# Patient Record
Sex: MALE | Race: WHITE | NOT HISPANIC OR LATINO | URBAN - METROPOLITAN AREA
[De-identification: names, ages, dates, MRNs, and addresses within clinical notes are randomized per-mention and may not be internally consistent; named-entity substitution may affect disease eponyms.]

---

## 2019-07-02 ENCOUNTER — HOSPITAL ENCOUNTER (OUTPATIENT)
Dept: OTHER | Facility: HOSPITAL | Age: 84
Discharge: HOME | End: 2019-07-02
Attending: NURSE PRACTITIONER

## 2019-07-02 ENCOUNTER — OFFICE VISIT CONVERTED (OUTPATIENT)
Dept: FAMILY MEDICINE CLINIC | Age: 84
End: 2019-07-02
Attending: NURSE PRACTITIONER

## 2019-07-02 LAB
BASOPHILS # BLD MANUAL: 0.02 10*3/UL (ref 0–0.2)
BASOPHILS NFR BLD MANUAL: 0.3 % (ref 0–3)
DEPRECATED RDW RBC AUTO: 62.6 FL
EOSINOPHIL # BLD MANUAL: 0.18 10*3/UL (ref 0–0.7)
EOSINOPHIL NFR BLD MANUAL: 3 % (ref 0–7)
ERYTHROCYTE [DISTWIDTH] IN BLOOD BY AUTOMATED COUNT: 17.5 % (ref 11.5–14.5)
GRANS (ABSOLUTE): 3.59 10*3/UL (ref 2–8)
GRANS: 58.8 % (ref 30–85)
HBA1C MFR BLD: 9.7 G/DL (ref 14–18)
HCT VFR BLD AUTO: 30.6 % (ref 42–52)
IMM GRANULOCYTES # BLD: 0.03 10*3/UL (ref 0–0.54)
IMM GRANULOCYTES NFR BLD: 0.5 % (ref 0–0.43)
LYMPHOCYTES # BLD MANUAL: 1.36 10*3/UL (ref 1–5)
LYMPHOCYTES NFR BLD MANUAL: 15.1 % (ref 3–10)
MCH RBC QN AUTO: 30.6 PG (ref 27–31)
MCHC RBC AUTO-ENTMCNC: 31.7 G/DL (ref 33–37)
MCV RBC AUTO: 96.5 FL (ref 80–96)
MONOCYTES # BLD AUTO: 0.92 10*3/UL (ref 0.2–1.2)
PLATELET # BLD AUTO: 107 10*3/UL (ref 130–400)
PMV BLD AUTO: 10.3 FL (ref 7.4–10.4)
RBC # BLD AUTO: 3.17 10*6/UL (ref 4.7–6.1)
VARIANT LYMPHS NFR BLD MANUAL: 22.3 % (ref 20–45)
WBC # BLD AUTO: 6.1 10*3/UL (ref 4.8–10.8)

## 2019-07-16 ENCOUNTER — OFFICE VISIT CONVERTED (OUTPATIENT)
Dept: FAMILY MEDICINE CLINIC | Age: 84
End: 2019-07-16
Attending: FAMILY MEDICINE

## 2019-07-17 ENCOUNTER — HOSPITAL ENCOUNTER (OUTPATIENT)
Dept: OTHER | Facility: HOSPITAL | Age: 84
Discharge: HOME | End: 2019-07-17
Attending: FAMILY MEDICINE

## 2019-07-17 LAB
ALBUMIN SERPL-MCNC: 4.1 G/DL (ref 3.5–5)
ALBUMIN/GLOB SERPL: 1.2 {RATIO} (ref 1.4–2.6)
ALP SERPL-CCNC: 128 U/L (ref 56–155)
ALT SERPL-CCNC: 16 U/L (ref 10–40)
ANION GAP SERPL CALC-SCNC: 17 MMOL/L (ref 8–19)
AST SERPL-CCNC: 19 U/L (ref 15–50)
BASOPHILS # BLD AUTO: 0.02 10*3/UL (ref 0–0.2)
BASOPHILS NFR BLD AUTO: 0.3 % (ref 0–3)
BILIRUB SERPL-MCNC: 0.28 MG/DL (ref 0.2–1.3)
BNP SERPL-MCNC: 5437 PG/ML (ref 0–1800)
BUN SERPL-MCNC: 41 MG/DL (ref 5–25)
BUN/CREAT SERPL: 21 {RATIO} (ref 6–20)
CALCIUM SERPL-MCNC: 9.5 MG/DL (ref 8.7–10.4)
CHLORIDE SERPL-SCNC: 102 MMOL/L (ref 99–111)
CHOLEST SERPL-MCNC: 67 MG/DL (ref 107–200)
CHOLEST/HDLC SERPL: 3.2 {RATIO} (ref 3–6)
CONV ABS IMM GRAN: 0.03 10*3/UL (ref 0–0.2)
CONV CO2: 27 MMOL/L (ref 22–32)
CONV CREATININE URINE, RANDOM: 60.8 MG/DL (ref 10–300)
CONV IMMATURE GRAN: 0.5 % (ref 0–1.8)
CONV MICROALBUM.,U,RANDOM: 53.7 MG/L (ref 0–20)
CONV TOTAL PROTEIN: 7.6 G/DL (ref 6.3–8.2)
CREAT UR-MCNC: 1.93 MG/DL (ref 0.7–1.2)
DEPRECATED RDW RBC AUTO: 63.7 FL (ref 35.1–43.9)
EOSINOPHIL # BLD AUTO: 0.26 10*3/UL (ref 0–0.7)
EOSINOPHIL # BLD AUTO: 4.1 % (ref 0–7)
ERYTHROCYTE [DISTWIDTH] IN BLOOD BY AUTOMATED COUNT: 17.7 % (ref 11.6–14.4)
EST. AVERAGE GLUCOSE BLD GHB EST-MCNC: 120 MG/DL
FERRITIN SERPL-MCNC: 659 NG/ML (ref 30–300)
FOLATE SERPL-MCNC: >20 NG/ML (ref 4.8–20)
GFR SERPLBLD BASED ON 1.73 SQ M-ARVRAT: 31 ML/MIN/{1.73_M2}
GLOBULIN UR ELPH-MCNC: 3.5 G/DL (ref 2–3.5)
GLUCOSE SERPL-MCNC: 112 MG/DL (ref 70–99)
HBA1C MFR BLD: 5.8 % (ref 3.5–5.7)
HBA1C MFR BLD: 9.5 G/DL (ref 14–18)
HCT VFR BLD AUTO: 30.5 % (ref 42–52)
HDLC SERPL-MCNC: 21 MG/DL (ref 40–60)
IRON SATN MFR SERPL: 40 % (ref 20–55)
IRON SERPL-MCNC: 91 UG/DL (ref 70–180)
LDLC SERPL CALC-MCNC: 29 MG/DL (ref 70–100)
LYMPHOCYTES # BLD AUTO: 1.21 10*3/UL (ref 1–5)
MCH RBC QN AUTO: 30.8 PG (ref 27–31)
MCHC RBC AUTO-ENTMCNC: 31.1 G/DL (ref 33–37)
MCV RBC AUTO: 99 FL (ref 80–96)
MICROALBUMIN/CREAT UR: 88.3 MG/G{CRE} (ref 0–25)
MONOCYTES # BLD AUTO: 1.09 10*3/UL (ref 0.2–1.2)
MONOCYTES NFR BLD AUTO: 17.1 % (ref 3–10)
NEUTROPHILS # BLD AUTO: 3.78 10*3/UL (ref 2–8)
NEUTROPHILS NFR BLD AUTO: 59.1 % (ref 30–85)
NRBC CBCN: 0 % (ref 0–0.7)
OSMOLALITY SERPL CALC.SUM OF ELEC: 305 MOSM/KG (ref 273–304)
PLATELET # BLD AUTO: 111 10*3/UL (ref 130–400)
PMV BLD AUTO: 11.7 FL (ref 9.4–12.4)
POTASSIUM SERPL-SCNC: 4.3 MMOL/L (ref 3.5–5.3)
RBC # BLD AUTO: 3.08 10*6/UL (ref 4.7–6.1)
RETICS # AUTO: 0.07 10*6/UL (ref 0.03–0.1)
RETICS/RBC NFR AUTO: 2.42 % (ref 0.51–1.81)
SODIUM SERPL-SCNC: 142 MMOL/L (ref 135–147)
TIBC SERPL-MCNC: 226 UG/DL (ref 245–450)
TRANSFERRIN SERPL-MCNC: 158 MG/DL (ref 215–365)
TRIGL SERPL-MCNC: 83 MG/DL (ref 40–150)
TSH SERPL-ACNC: 1.61 M[IU]/L (ref 0.27–4.2)
VARIANT LYMPHS NFR BLD MANUAL: 18.9 % (ref 20–45)
VIT B12 SERPL-MCNC: >2000 PG/ML (ref 211–911)
VLDLC SERPL-MCNC: 17 MG/DL (ref 5–37)
WBC # BLD AUTO: 6.39 10*3/UL (ref 4.8–10.8)

## 2019-07-18 LAB
ASO AB SERPL-ACNC: 35 [IU]/ML (ref 0–200)
CONV ANTI NUCLEAR ANTIBODY WITH REFLEX: NEGATIVE
CONV RHEUMATOID FACTOR IGM: <10 [IU]/ML (ref 0–14)
CRP SERPL-MCNC: 5.6 MG/L (ref 0–5)
ERYTHROCYTE [SEDIMENTATION RATE] IN BLOOD: 94 MM/H (ref 0–20)
PHOSPHATE SERPL-MCNC: 3.4 MG/DL (ref 2.4–4.5)
URATE SERPL-MCNC: 8.6 MG/DL (ref 3.5–8.5)

## 2019-07-23 ENCOUNTER — OFFICE VISIT CONVERTED (OUTPATIENT)
Dept: FAMILY MEDICINE CLINIC | Age: 84
End: 2019-07-23
Attending: FAMILY MEDICINE

## 2019-07-23 ENCOUNTER — HOSPITAL ENCOUNTER (OUTPATIENT)
Dept: OTHER | Facility: HOSPITAL | Age: 84
Discharge: HOME | End: 2019-07-23
Attending: FAMILY MEDICINE

## 2019-07-23 LAB
ALBUMIN SERPL-MCNC: 4.2 G/DL (ref 3.5–5)
ALBUMIN/GLOB SERPL: 1.1 {RATIO} (ref 1.4–2.6)
ALP SERPL-CCNC: 111 U/L (ref 56–155)
ALT SERPL-CCNC: 14 U/L (ref 10–40)
ANION GAP SERPL CALC-SCNC: 19 MMOL/L (ref 8–19)
AST SERPL-CCNC: 22 U/L (ref 15–50)
BASOPHILS # BLD MANUAL: 0.02 10*3/UL (ref 0–0.2)
BASOPHILS NFR BLD MANUAL: 0.3 % (ref 0–3)
BILIRUB SERPL-MCNC: 0.36 MG/DL (ref 0.2–1.3)
BNP SERPL-MCNC: 5262 PG/ML (ref 0–1800)
BUN SERPL-MCNC: 47 MG/DL (ref 5–25)
BUN/CREAT SERPL: 20 {RATIO} (ref 6–20)
CALCIUM SERPL-MCNC: 9.9 MG/DL (ref 8.7–10.4)
CHLORIDE SERPL-SCNC: 101 MMOL/L (ref 99–111)
CONV CO2: 26 MMOL/L (ref 22–32)
CONV TOTAL PROTEIN: 8.1 G/DL (ref 6.3–8.2)
CREAT UR-MCNC: 2.3 MG/DL (ref 0.7–1.2)
DEPRECATED RDW RBC AUTO: 63.7 FL
EOSINOPHIL # BLD MANUAL: 0.25 10*3/UL (ref 0–0.7)
EOSINOPHIL NFR BLD MANUAL: 3.9 % (ref 0–7)
ERYTHROCYTE [DISTWIDTH] IN BLOOD BY AUTOMATED COUNT: 18.3 % (ref 11.5–14.5)
GFR SERPLBLD BASED ON 1.73 SQ M-ARVRAT: 25 ML/MIN/{1.73_M2}
GLOBULIN UR ELPH-MCNC: 3.9 G/DL (ref 2–3.5)
GLUCOSE SERPL-MCNC: 77 MG/DL (ref 70–99)
GRANS (ABSOLUTE): 3.54 10*3/UL (ref 2–8)
GRANS: 54.6 % (ref 30–85)
HBA1C MFR BLD: 10.2 G/DL (ref 14–18)
HCT VFR BLD AUTO: 31.9 % (ref 42–52)
IMM GRANULOCYTES # BLD: 0.03 10*3/UL (ref 0–0.54)
IMM GRANULOCYTES NFR BLD: 0.5 % (ref 0–0.43)
LYMPHOCYTES # BLD MANUAL: 1.64 10*3/UL (ref 1–5)
LYMPHOCYTES NFR BLD MANUAL: 15.4 % (ref 3–10)
MCH RBC QN AUTO: 30.7 PG (ref 27–31)
MCHC RBC AUTO-ENTMCNC: 32 G/DL (ref 33–37)
MCV RBC AUTO: 96.1 FL (ref 80–96)
MONOCYTES # BLD AUTO: 1 10*3/UL (ref 0.2–1.2)
OSMOLALITY SERPL CALC.SUM OF ELEC: 303 MOSM/KG (ref 273–304)
PLATELET # BLD AUTO: 106 10*3/UL (ref 130–400)
PMV BLD AUTO: 10.1 FL (ref 7.4–10.4)
POTASSIUM SERPL-SCNC: 4.5 MMOL/L (ref 3.5–5.3)
RBC # BLD AUTO: 3.32 10*6/UL (ref 4.7–6.1)
SODIUM SERPL-SCNC: 141 MMOL/L (ref 135–147)
VARIANT LYMPHS NFR BLD MANUAL: 25.3 % (ref 20–45)
WBC # BLD AUTO: 6.48 10*3/UL (ref 4.8–10.8)

## 2019-08-01 ENCOUNTER — OFFICE VISIT CONVERTED (OUTPATIENT)
Dept: SURGERY | Facility: CLINIC | Age: 84
End: 2019-08-01
Attending: NURSE PRACTITIONER

## 2019-08-01 ENCOUNTER — CONVERSION ENCOUNTER (OUTPATIENT)
Dept: SURGERY | Facility: CLINIC | Age: 84
End: 2019-08-01

## 2019-08-07 ENCOUNTER — OFFICE VISIT CONVERTED (OUTPATIENT)
Dept: FAMILY MEDICINE CLINIC | Age: 84
End: 2019-08-07
Attending: FAMILY MEDICINE

## 2019-08-15 ENCOUNTER — HOSPITAL ENCOUNTER (OUTPATIENT)
Dept: OTHER | Facility: HOSPITAL | Age: 84
Discharge: HOME | End: 2019-08-15
Attending: INTERNAL MEDICINE

## 2019-08-19 ENCOUNTER — OFFICE VISIT CONVERTED (OUTPATIENT)
Dept: UROLOGY | Facility: CLINIC | Age: 84
End: 2019-08-19
Attending: UROLOGY

## 2019-08-19 ENCOUNTER — CONVERSION ENCOUNTER (OUTPATIENT)
Dept: SURGERY | Facility: CLINIC | Age: 84
End: 2019-08-19

## 2019-08-21 ENCOUNTER — HOSPITAL ENCOUNTER (OUTPATIENT)
Dept: OTHER | Facility: HOSPITAL | Age: 84
Discharge: HOME | End: 2019-08-21
Attending: UROLOGY

## 2019-08-28 ENCOUNTER — CONVERSION ENCOUNTER (OUTPATIENT)
Dept: CARDIOLOGY | Facility: CLINIC | Age: 84
End: 2019-08-28

## 2019-08-28 ENCOUNTER — OFFICE VISIT CONVERTED (OUTPATIENT)
Dept: CARDIOLOGY | Facility: CLINIC | Age: 84
End: 2019-08-28
Attending: INTERNAL MEDICINE

## 2019-08-29 ENCOUNTER — OFFICE VISIT CONVERTED (OUTPATIENT)
Dept: UROLOGY | Facility: CLINIC | Age: 84
End: 2019-08-29
Attending: UROLOGY

## 2019-08-29 ENCOUNTER — CONVERSION ENCOUNTER (OUTPATIENT)
Dept: SURGERY | Facility: CLINIC | Age: 84
End: 2019-08-29

## 2019-09-18 ENCOUNTER — HOSPITAL ENCOUNTER (OUTPATIENT)
Dept: OTHER | Facility: HOSPITAL | Age: 84
Discharge: HOME | End: 2019-09-18
Attending: INTERNAL MEDICINE

## 2019-09-18 LAB
ALBUMIN SERPL-MCNC: 3.8 G/DL (ref 3.5–5)
ALBUMIN/GLOB SERPL: 1.2 {RATIO} (ref 1.4–2.6)
ALP SERPL-CCNC: 95 U/L (ref 56–155)
ALT SERPL-CCNC: 21 U/L (ref 10–40)
ANION GAP SERPL CALC-SCNC: 20 MMOL/L (ref 8–19)
AST SERPL-CCNC: 27 U/L (ref 15–50)
BASOPHILS # BLD AUTO: 0.08 10*3/UL (ref 0–0.2)
BASOPHILS NFR BLD AUTO: 0.9 % (ref 0–3)
BILIRUB SERPL-MCNC: 0.65 MG/DL (ref 0.2–1.3)
BUN SERPL-MCNC: 54 MG/DL (ref 5–25)
BUN/CREAT SERPL: 26 {RATIO} (ref 6–20)
CALCIUM SERPL-MCNC: 10.4 MG/DL (ref 8.7–10.4)
CHLORIDE SERPL-SCNC: 106 MMOL/L (ref 99–111)
CONV ABS IMM GRAN: 0.4 10*3/UL (ref 0–0.2)
CONV CO2: 23 MMOL/L (ref 22–32)
CONV IMMATURE GRAN: 4.6 % (ref 0–1.8)
CONV TOTAL PROTEIN: 7.1 G/DL (ref 6.3–8.2)
CREAT UR-MCNC: 2.11 MG/DL (ref 0.7–1.2)
DEPRECATED RDW RBC AUTO: 88 FL (ref 35.1–43.9)
EOSINOPHIL # BLD AUTO: 0.16 10*3/UL (ref 0–0.7)
EOSINOPHIL # BLD AUTO: 1.8 % (ref 0–7)
ERYTHROCYTE [DISTWIDTH] IN BLOOD BY AUTOMATED COUNT: 20.8 % (ref 11.6–14.4)
GFR SERPLBLD BASED ON 1.73 SQ M-ARVRAT: 28 ML/MIN/{1.73_M2}
GLOBULIN UR ELPH-MCNC: 3.3 G/DL (ref 2–3.5)
GLUCOSE SERPL-MCNC: 228 MG/DL (ref 70–99)
HCT VFR BLD AUTO: 32.8 % (ref 42–52)
HGB BLD-MCNC: 9 G/DL (ref 14–18)
LYMPHOCYTES # BLD AUTO: 1.43 10*3/UL (ref 1–5)
LYMPHOCYTES NFR BLD AUTO: 16.5 % (ref 20–45)
MCH RBC QN AUTO: 32.1 PG (ref 27–31)
MCHC RBC AUTO-ENTMCNC: 27.4 G/DL (ref 33–37)
MCV RBC AUTO: 117.1 FL (ref 80–96)
MONOCYTES # BLD AUTO: 1.15 10*3/UL (ref 0.2–1.2)
MONOCYTES NFR BLD AUTO: 13.2 % (ref 3–10)
NEUTROPHILS # BLD AUTO: 5.46 10*3/UL (ref 2–8)
NEUTROPHILS NFR BLD AUTO: 63 % (ref 30–85)
NRBC CBCN: 0.3 % (ref 0–0.7)
OSMOLALITY SERPL CALC.SUM OF ELEC: 320 MOSM/KG (ref 273–304)
PLATELET # BLD AUTO: 103 10*3/UL (ref 130–400)
PMV BLD AUTO: 10 FL (ref 9.4–12.4)
POTASSIUM SERPL-SCNC: 5.2 MMOL/L (ref 3.5–5.3)
RBC # BLD AUTO: 2.8 10*6/UL (ref 4.7–6.1)
SODIUM SERPL-SCNC: 144 MMOL/L (ref 135–147)
WBC # BLD AUTO: 8.68 10*3/UL (ref 4.8–10.8)

## 2019-09-19 LAB
IRON SATN MFR SERPL: 23 % (ref 20–55)
IRON SERPL-MCNC: 61 UG/DL (ref 70–180)
TIBC SERPL-MCNC: 263 UG/DL (ref 245–450)
TRANSFERRIN SERPL-MCNC: 184 MG/DL (ref 215–365)

## 2019-09-25 ENCOUNTER — OFFICE VISIT CONVERTED (OUTPATIENT)
Dept: FAMILY MEDICINE CLINIC | Age: 84
End: 2019-09-25
Attending: FAMILY MEDICINE

## 2019-09-26 ENCOUNTER — HOSPITAL ENCOUNTER (OUTPATIENT)
Dept: OTHER | Facility: HOSPITAL | Age: 84
Discharge: HOME | End: 2019-09-26
Attending: FAMILY MEDICINE

## 2019-09-26 LAB
ALBUMIN SERPL-MCNC: 3.9 G/DL (ref 3.5–5)
ALBUMIN/GLOB SERPL: 1.3 {RATIO} (ref 1.4–2.6)
ALP SERPL-CCNC: 93 U/L (ref 56–155)
ALT SERPL-CCNC: 28 U/L (ref 10–40)
ANION GAP SERPL CALC-SCNC: 22 MMOL/L (ref 8–19)
AST SERPL-CCNC: 30 U/L (ref 15–50)
BASOPHILS # BLD AUTO: 0.03 10*3/UL (ref 0–0.2)
BASOPHILS NFR BLD AUTO: 0.5 % (ref 0–3)
BILIRUB SERPL-MCNC: 0.37 MG/DL (ref 0.2–1.3)
BNP SERPL-MCNC: ABNORMAL PG/ML (ref 0–1800)
BUN SERPL-MCNC: 47 MG/DL (ref 5–25)
BUN/CREAT SERPL: 20 {RATIO} (ref 6–20)
CALCIUM SERPL-MCNC: 9.1 MG/DL (ref 8.7–10.4)
CHLORIDE SERPL-SCNC: 104 MMOL/L (ref 99–111)
CONV ABS IMM GRAN: 0.06 10*3/UL (ref 0–0.2)
CONV CO2: 21 MMOL/L (ref 22–32)
CONV IMMATURE GRAN: 0.9 % (ref 0–1.8)
CONV TOTAL PROTEIN: 7 G/DL (ref 6.3–8.2)
CREAT UR-MCNC: 2.36 MG/DL (ref 0.7–1.2)
DEPRECATED RDW RBC AUTO: 89.2 FL (ref 35.1–43.9)
EOSINOPHIL # BLD AUTO: 0.16 10*3/UL (ref 0–0.7)
EOSINOPHIL # BLD AUTO: 2.5 % (ref 0–7)
ERYTHROCYTE [DISTWIDTH] IN BLOOD BY AUTOMATED COUNT: 22.7 % (ref 11.6–14.4)
FERRITIN SERPL-MCNC: 700 NG/ML (ref 30–300)
FOLATE SERPL-MCNC: >20 NG/ML (ref 4.8–20)
GFR SERPLBLD BASED ON 1.73 SQ M-ARVRAT: 24 ML/MIN/{1.73_M2}
GLOBULIN UR ELPH-MCNC: 3.1 G/DL (ref 2–3.5)
GLUCOSE SERPL-MCNC: 169 MG/DL (ref 70–99)
HCT VFR BLD AUTO: 29.7 % (ref 42–52)
HGB BLD-MCNC: 8.8 G/DL (ref 14–18)
IRON SATN MFR SERPL: 21 % (ref 20–55)
IRON SERPL-MCNC: 55 UG/DL (ref 70–180)
LYMPHOCYTES # BLD AUTO: 1.22 10*3/UL (ref 1–5)
LYMPHOCYTES NFR BLD AUTO: 18.8 % (ref 20–45)
MCH RBC QN AUTO: 32.7 PG (ref 27–31)
MCHC RBC AUTO-ENTMCNC: 29.6 G/DL (ref 33–37)
MCV RBC AUTO: 110.4 FL (ref 80–96)
MONOCYTES # BLD AUTO: 1.17 10*3/UL (ref 0.2–1.2)
MONOCYTES NFR BLD AUTO: 18 % (ref 3–10)
NEUTROPHILS # BLD AUTO: 3.85 10*3/UL (ref 2–8)
NEUTROPHILS NFR BLD AUTO: 59.3 % (ref 30–85)
NRBC CBCN: 0 % (ref 0–0.7)
OSMOLALITY SERPL CALC.SUM OF ELEC: 310 MOSM/KG (ref 273–304)
PLATELET # BLD AUTO: 93 10*3/UL (ref 130–400)
PMV BLD AUTO: 10.7 FL (ref 9.4–12.4)
POTASSIUM SERPL-SCNC: 5.2 MMOL/L (ref 3.5–5.3)
RBC # BLD AUTO: 2.69 10*6/UL (ref 4.7–6.1)
RETICS # AUTO: 0.16 10*6/UL (ref 0.03–0.1)
RETICS/RBC NFR AUTO: 5.9 % (ref 0.51–1.81)
SODIUM SERPL-SCNC: 142 MMOL/L (ref 135–147)
TIBC SERPL-MCNC: 263 UG/DL (ref 245–450)
TRANSFERRIN SERPL-MCNC: 184 MG/DL (ref 215–365)
VIT B12 SERPL-MCNC: 1589 PG/ML (ref 211–911)
WBC # BLD AUTO: 6.49 10*3/UL (ref 4.8–10.8)

## 2019-09-30 ENCOUNTER — OFFICE VISIT CONVERTED (OUTPATIENT)
Dept: PULMONOLOGY | Facility: CLINIC | Age: 84
End: 2019-09-30
Attending: PHYSICIAN ASSISTANT

## 2019-10-01 ENCOUNTER — OFFICE VISIT CONVERTED (OUTPATIENT)
Dept: CARDIOLOGY | Facility: CLINIC | Age: 84
End: 2019-10-01
Attending: INTERNAL MEDICINE

## 2019-10-01 ENCOUNTER — CONVERSION ENCOUNTER (OUTPATIENT)
Dept: CARDIOLOGY | Facility: CLINIC | Age: 84
End: 2019-10-01

## 2019-10-04 ENCOUNTER — OFFICE VISIT CONVERTED (OUTPATIENT)
Dept: GASTROENTEROLOGY | Facility: CLINIC | Age: 84
End: 2019-10-04
Attending: PHYSICIAN ASSISTANT

## 2019-10-14 ENCOUNTER — HOSPITAL ENCOUNTER (OUTPATIENT)
Dept: CARDIOLOGY | Facility: HOSPITAL | Age: 84
Discharge: HOME | End: 2019-10-14
Attending: PHYSICIAN ASSISTANT

## 2019-10-15 ENCOUNTER — HOSPITAL ENCOUNTER (OUTPATIENT)
Dept: OTHER | Facility: HOSPITAL | Age: 84
Discharge: HOME | End: 2019-10-15
Attending: FAMILY MEDICINE

## 2019-10-15 LAB
ALBUMIN SERPL-MCNC: 3.8 G/DL (ref 3.5–5)
ALBUMIN/GLOB SERPL: 1.2 {RATIO} (ref 1.4–2.6)
ALP SERPL-CCNC: 97 U/L (ref 56–155)
ALT SERPL-CCNC: 16 U/L (ref 10–40)
ANION GAP SERPL CALC-SCNC: 18 MMOL/L (ref 8–19)
AST SERPL-CCNC: 21 U/L (ref 15–50)
BASOPHILS # BLD AUTO: 0.03 10*3/UL (ref 0–0.2)
BASOPHILS NFR BLD AUTO: 0.5 % (ref 0–3)
BILIRUB SERPL-MCNC: 0.29 MG/DL (ref 0.2–1.3)
BNP SERPL-MCNC: ABNORMAL PG/ML (ref 0–1800)
BUN SERPL-MCNC: 24 MG/DL (ref 5–25)
BUN/CREAT SERPL: 15 {RATIO} (ref 6–20)
CALCIUM SERPL-MCNC: 9.4 MG/DL (ref 8.7–10.4)
CHLORIDE SERPL-SCNC: 102 MMOL/L (ref 99–111)
CONV ABS IMM GRAN: 0.03 10*3/UL (ref 0–0.2)
CONV CO2: 23 MMOL/L (ref 22–32)
CONV IMMATURE GRAN: 0.5 % (ref 0–1.8)
CONV TOTAL PROTEIN: 7 G/DL (ref 6.3–8.2)
CREAT UR-MCNC: 1.6 MG/DL (ref 0.7–1.2)
DEPRECATED RDW RBC AUTO: 79.4 FL (ref 35.1–43.9)
EOSINOPHIL # BLD AUTO: 0.18 10*3/UL (ref 0–0.7)
EOSINOPHIL # BLD AUTO: 3.3 % (ref 0–7)
ERYTHROCYTE [DISTWIDTH] IN BLOOD BY AUTOMATED COUNT: 18.6 % (ref 11.6–14.4)
FERRITIN SERPL-MCNC: 731 NG/ML (ref 30–300)
FOLATE SERPL-MCNC: >20 NG/ML (ref 4.8–20)
GFR SERPLBLD BASED ON 1.73 SQ M-ARVRAT: 39 ML/MIN/{1.73_M2}
GLOBULIN UR ELPH-MCNC: 3.2 G/DL (ref 2–3.5)
GLUCOSE SERPL-MCNC: 111 MG/DL (ref 70–99)
HCT VFR BLD AUTO: 33 % (ref 42–52)
HGB BLD-MCNC: 9.5 G/DL (ref 14–18)
IRON SATN MFR SERPL: 21 % (ref 20–55)
IRON SERPL-MCNC: 51 UG/DL (ref 70–180)
LYMPHOCYTES # BLD AUTO: 1.09 10*3/UL (ref 1–5)
LYMPHOCYTES NFR BLD AUTO: 20 % (ref 20–45)
MCH RBC QN AUTO: 33.1 PG (ref 27–31)
MCHC RBC AUTO-ENTMCNC: 28.8 G/DL (ref 33–37)
MCV RBC AUTO: 115 FL (ref 80–96)
MONOCYTES # BLD AUTO: 0.98 10*3/UL (ref 0.2–1.2)
MONOCYTES NFR BLD AUTO: 17.9 % (ref 3–10)
NEUTROPHILS # BLD AUTO: 3.15 10*3/UL (ref 2–8)
NEUTROPHILS NFR BLD AUTO: 57.8 % (ref 30–85)
NRBC CBCN: 0 % (ref 0–0.7)
OSMOLALITY SERPL CALC.SUM OF ELEC: 293 MOSM/KG (ref 273–304)
PLATELET # BLD AUTO: 72 10*3/UL (ref 130–400)
PMV BLD AUTO: 10.6 FL (ref 9.4–12.4)
POTASSIUM SERPL-SCNC: 4.4 MMOL/L (ref 3.5–5.3)
RBC # BLD AUTO: 2.87 10*6/UL (ref 4.7–6.1)
RETICS # AUTO: 0.09 10*6/UL (ref 0.03–0.1)
RETICS/RBC NFR AUTO: 3.05 % (ref 0.51–1.81)
SODIUM SERPL-SCNC: 139 MMOL/L (ref 135–147)
TIBC SERPL-MCNC: 247 UG/DL (ref 245–450)
TRANSFERRIN SERPL-MCNC: 173 MG/DL (ref 215–365)
VIT B12 SERPL-MCNC: 1210 PG/ML (ref 211–911)
WBC # BLD AUTO: 5.46 10*3/UL (ref 4.8–10.8)

## 2019-10-16 ENCOUNTER — OFFICE VISIT CONVERTED (OUTPATIENT)
Dept: FAMILY MEDICINE CLINIC | Age: 84
End: 2019-10-16
Attending: FAMILY MEDICINE

## 2019-10-17 ENCOUNTER — HOSPITAL ENCOUNTER (OUTPATIENT)
Dept: OTHER | Facility: HOSPITAL | Age: 84
Discharge: HOME | End: 2019-10-17
Attending: FAMILY MEDICINE

## 2019-10-19 LAB
EST. AVERAGE GLUCOSE BLD GHB EST-MCNC: 103 MG/DL
HBA1C MFR BLD: 5.2 % (ref 3.5–5.7)

## 2019-10-24 ENCOUNTER — OFFICE VISIT CONVERTED (OUTPATIENT)
Dept: PULMONOLOGY | Facility: CLINIC | Age: 84
End: 2019-10-24
Attending: INTERNAL MEDICINE

## 2019-11-13 ENCOUNTER — OFFICE VISIT CONVERTED (OUTPATIENT)
Dept: FAMILY MEDICINE CLINIC | Age: 84
End: 2019-11-13
Attending: FAMILY MEDICINE

## 2019-11-13 ENCOUNTER — HOSPITAL ENCOUNTER (OUTPATIENT)
Dept: OTHER | Facility: HOSPITAL | Age: 84
Discharge: HOME | End: 2019-11-13
Attending: FAMILY MEDICINE

## 2019-11-13 LAB
ALBUMIN SERPL-MCNC: 4.1 G/DL (ref 3.5–5)
ALBUMIN/GLOB SERPL: 1.2 {RATIO} (ref 1.4–2.6)
ALP SERPL-CCNC: 104 U/L (ref 56–155)
ALT SERPL-CCNC: 15 U/L (ref 10–40)
ANION GAP SERPL CALC-SCNC: 18 MMOL/L (ref 8–19)
AST SERPL-CCNC: 20 U/L (ref 15–50)
BASOPHILS # BLD AUTO: 0.02 10*3/UL (ref 0–0.2)
BASOPHILS NFR BLD AUTO: 0.3 % (ref 0–3)
BILIRUB SERPL-MCNC: 0.39 MG/DL (ref 0.2–1.3)
BNP SERPL-MCNC: ABNORMAL PG/ML (ref 0–1800)
BUN SERPL-MCNC: 31 MG/DL (ref 5–25)
BUN/CREAT SERPL: 18 {RATIO} (ref 6–20)
CALCIUM SERPL-MCNC: 9.7 MG/DL (ref 8.7–10.4)
CHLORIDE SERPL-SCNC: 100 MMOL/L (ref 99–111)
CONV ABS IMM GRAN: 0.04 10*3/UL (ref 0–0.2)
CONV CO2: 27 MMOL/L (ref 22–32)
CONV IMMATURE GRAN: 0.6 % (ref 0–1.8)
CONV TOTAL PROTEIN: 7.4 G/DL (ref 6.3–8.2)
CREAT UR-MCNC: 1.73 MG/DL (ref 0.7–1.2)
DEPRECATED RDW RBC AUTO: 70.7 FL (ref 35.1–43.9)
EOSINOPHIL # BLD AUTO: 0.23 10*3/UL (ref 0–0.7)
EOSINOPHIL # BLD AUTO: 3.5 % (ref 0–7)
ERYTHROCYTE [DISTWIDTH] IN BLOOD BY AUTOMATED COUNT: 17.9 % (ref 11.6–14.4)
GFR SERPLBLD BASED ON 1.73 SQ M-ARVRAT: 36 ML/MIN/{1.73_M2}
GLOBULIN UR ELPH-MCNC: 3.3 G/DL (ref 2–3.5)
GLUCOSE SERPL-MCNC: 105 MG/DL (ref 70–99)
HCT VFR BLD AUTO: 30.8 % (ref 42–52)
HGB BLD-MCNC: 9.3 G/DL (ref 14–18)
LYMPHOCYTES # BLD AUTO: 1.19 10*3/UL (ref 1–5)
LYMPHOCYTES NFR BLD AUTO: 18.3 % (ref 20–45)
MCH RBC QN AUTO: 32.9 PG (ref 27–31)
MCHC RBC AUTO-ENTMCNC: 30.2 G/DL (ref 33–37)
MCV RBC AUTO: 108.8 FL (ref 80–96)
MONOCYTES # BLD AUTO: 1.18 10*3/UL (ref 0.2–1.2)
MONOCYTES NFR BLD AUTO: 18.1 % (ref 3–10)
NEUTROPHILS # BLD AUTO: 3.86 10*3/UL (ref 2–8)
NEUTROPHILS NFR BLD AUTO: 59.2 % (ref 30–85)
NRBC CBCN: 0 % (ref 0–0.7)
OSMOLALITY SERPL CALC.SUM OF ELEC: 299 MOSM/KG (ref 273–304)
PLATELET # BLD AUTO: 98 10*3/UL (ref 130–400)
PMV BLD AUTO: 11.2 FL (ref 9.4–12.4)
POTASSIUM SERPL-SCNC: 4.1 MMOL/L (ref 3.5–5.3)
RBC # BLD AUTO: 2.83 10*6/UL (ref 4.7–6.1)
SODIUM SERPL-SCNC: 141 MMOL/L (ref 135–147)
TSH SERPL-ACNC: 0.31 M[IU]/L (ref 0.27–4.2)
WBC # BLD AUTO: 6.52 10*3/UL (ref 4.8–10.8)

## 2019-11-14 LAB
IRON SATN MFR SERPL: 15 % (ref 20–55)
IRON SERPL-MCNC: 38 UG/DL (ref 70–180)
TIBC SERPL-MCNC: 252 UG/DL (ref 245–450)
TRANSFERRIN SERPL-MCNC: 176 MG/DL (ref 215–365)

## 2019-11-21 ENCOUNTER — OFFICE VISIT CONVERTED (OUTPATIENT)
Dept: PODIATRY | Facility: CLINIC | Age: 84
End: 2019-11-21
Attending: PODIATRIST

## 2019-12-10 ENCOUNTER — HOSPITAL ENCOUNTER (OUTPATIENT)
Dept: PREADMISSION TESTING | Facility: HOSPITAL | Age: 84
Discharge: HOME | End: 2019-12-10
Attending: UROLOGY

## 2019-12-10 LAB
ALBUMIN SERPL-MCNC: 3.4 G/DL (ref 3.5–5)
ALBUMIN/GLOB SERPL: 0.9 {RATIO} (ref 1.4–2.6)
ALP SERPL-CCNC: 109 U/L (ref 56–155)
ALT SERPL-CCNC: 17 U/L (ref 10–40)
ANION GAP SERPL CALC-SCNC: 16 MMOL/L (ref 8–19)
AST SERPL-CCNC: 20 U/L (ref 15–50)
BASOPHILS # BLD AUTO: 0.02 10*3/UL (ref 0–0.2)
BASOPHILS NFR BLD AUTO: 0.3 % (ref 0–3)
BILIRUB SERPL-MCNC: 0.35 MG/DL (ref 0.2–1.3)
BUN SERPL-MCNC: 41 MG/DL (ref 5–25)
BUN/CREAT SERPL: 21 {RATIO} (ref 6–20)
CALCIUM SERPL-MCNC: 10.2 MG/DL (ref 8.7–10.4)
CHLORIDE SERPL-SCNC: 103 MMOL/L (ref 99–111)
CONV ABS IMM GRAN: 0.04 10*3/UL (ref 0–0.2)
CONV CO2: 23 MMOL/L (ref 22–32)
CONV IMMATURE GRAN: 0.7 % (ref 0–1.8)
CONV TOTAL PROTEIN: 7.2 G/DL (ref 6.3–8.2)
CREAT UR-MCNC: 1.95 MG/DL (ref 0.7–1.2)
DEPRECATED RDW RBC AUTO: 65.9 FL (ref 35.1–43.9)
EOSINOPHIL # BLD AUTO: 0.06 10*3/UL (ref 0–0.7)
EOSINOPHIL # BLD AUTO: 1 % (ref 0–7)
ERYTHROCYTE [DISTWIDTH] IN BLOOD BY AUTOMATED COUNT: 17 % (ref 11.6–14.4)
GFR SERPLBLD BASED ON 1.73 SQ M-ARVRAT: 31 ML/MIN/{1.73_M2}
GLOBULIN UR ELPH-MCNC: 3.8 G/DL (ref 2–3.5)
GLUCOSE SERPL-MCNC: 145 MG/DL (ref 70–99)
HCT VFR BLD AUTO: 27.5 % (ref 42–52)
HGB BLD-MCNC: 8.4 G/DL (ref 14–18)
INR PPP: 1.14 (ref 2–3)
IRON SATN MFR SERPL: 18 % (ref 20–55)
IRON SERPL-MCNC: 36 UG/DL (ref 70–180)
LYMPHOCYTES # BLD AUTO: 1.08 10*3/UL (ref 1–5)
LYMPHOCYTES NFR BLD AUTO: 17.8 % (ref 20–45)
MCH RBC QN AUTO: 32.1 PG (ref 27–31)
MCHC RBC AUTO-ENTMCNC: 30.5 G/DL (ref 33–37)
MCV RBC AUTO: 105 FL (ref 80–96)
MONOCYTES # BLD AUTO: 0.9 10*3/UL (ref 0.2–1.2)
MONOCYTES NFR BLD AUTO: 14.9 % (ref 3–10)
NEUTROPHILS # BLD AUTO: 3.96 10*3/UL (ref 2–8)
NEUTROPHILS NFR BLD AUTO: 65.3 % (ref 30–85)
NRBC CBCN: 0 % (ref 0–0.7)
OSMOLALITY SERPL CALC.SUM OF ELEC: 297 MOSM/KG (ref 273–304)
PLATELET # BLD AUTO: 80 10*3/UL (ref 130–400)
PMV BLD AUTO: 10.1 FL (ref 9.4–12.4)
POTASSIUM SERPL-SCNC: 4.9 MMOL/L (ref 3.5–5.3)
PROTHROMBIN TIME: 12 S (ref 9.4–12)
RBC # BLD AUTO: 2.62 10*6/UL (ref 4.7–6.1)
RETICS # AUTO: 0.03 10*6/UL (ref 0.03–0.1)
RETICS/RBC NFR AUTO: 1.31 % (ref 0.51–1.81)
SODIUM SERPL-SCNC: 137 MMOL/L (ref 135–147)
TIBC SERPL-MCNC: 202 UG/DL (ref 245–450)
TRANSFERRIN SERPL-MCNC: 141 MG/DL (ref 215–365)
WBC # BLD AUTO: 6.06 10*3/UL (ref 4.8–10.8)

## 2019-12-26 ENCOUNTER — OFFICE VISIT CONVERTED (OUTPATIENT)
Dept: FAMILY MEDICINE CLINIC | Age: 84
End: 2019-12-26
Attending: FAMILY MEDICINE

## 2020-01-02 ENCOUNTER — OFFICE VISIT CONVERTED (OUTPATIENT)
Dept: UROLOGY | Facility: CLINIC | Age: 85
End: 2020-01-02
Attending: UROLOGY

## 2020-01-10 ENCOUNTER — HOSPITAL ENCOUNTER (OUTPATIENT)
Dept: PERIOP | Facility: HOSPITAL | Age: 85
Setting detail: HOSPITAL OUTPATIENT SURGERY
Discharge: HOME | End: 2020-01-10
Attending: UROLOGY

## 2020-01-10 LAB
GLUCOSE BLD-MCNC: 114 MG/DL (ref 70–99)
GLUCOSE BLD-MCNC: 66 MG/DL (ref 70–99)
GLUCOSE BLD-MCNC: 77 MG/DL (ref 70–99)
GLUCOSE BLD-MCNC: 88 MG/DL (ref 70–99)

## 2020-01-15 ENCOUNTER — HOSPITAL ENCOUNTER (OUTPATIENT)
Dept: SURGERY | Facility: CLINIC | Age: 85
Discharge: HOME | End: 2020-01-15
Attending: UROLOGY

## 2020-01-15 ENCOUNTER — CONVERSION ENCOUNTER (OUTPATIENT)
Dept: OTHER | Facility: HOSPITAL | Age: 85
End: 2020-01-15

## 2020-01-15 ENCOUNTER — CONVERSION ENCOUNTER (OUTPATIENT)
Dept: SURGERY | Facility: CLINIC | Age: 85
End: 2020-01-15

## 2020-01-15 ENCOUNTER — OFFICE VISIT CONVERTED (OUTPATIENT)
Dept: CARDIOLOGY | Facility: CLINIC | Age: 85
End: 2020-01-15
Attending: INTERNAL MEDICINE

## 2020-01-15 ENCOUNTER — OFFICE VISIT CONVERTED (OUTPATIENT)
Dept: UROLOGY | Facility: CLINIC | Age: 85
End: 2020-01-15
Attending: UROLOGY

## 2020-01-17 ENCOUNTER — OFFICE VISIT CONVERTED (OUTPATIENT)
Dept: FAMILY MEDICINE CLINIC | Age: 85
End: 2020-01-17
Attending: FAMILY MEDICINE

## 2020-01-17 ENCOUNTER — HOSPITAL ENCOUNTER (OUTPATIENT)
Dept: OTHER | Facility: HOSPITAL | Age: 85
Discharge: HOME | End: 2020-01-17
Attending: FAMILY MEDICINE

## 2020-01-17 LAB
ALBUMIN SERPL-MCNC: 3.6 G/DL (ref 3.5–5)
ALBUMIN/GLOB SERPL: 0.9 {RATIO} (ref 1.4–2.6)
ALP SERPL-CCNC: 102 U/L (ref 56–155)
ALT SERPL-CCNC: 23 U/L (ref 10–40)
ANION GAP SERPL CALC-SCNC: 19 MMOL/L (ref 8–19)
AST SERPL-CCNC: 24 U/L (ref 15–50)
BACTERIA UR CULT: NORMAL
BASOPHILS # BLD AUTO: 0.02 10*3/UL (ref 0–0.2)
BASOPHILS NFR BLD AUTO: 0.3 % (ref 0–3)
BILIRUB SERPL-MCNC: 0.39 MG/DL (ref 0.2–1.3)
BUN SERPL-MCNC: 35 MG/DL (ref 5–25)
BUN/CREAT SERPL: 19 {RATIO} (ref 6–20)
CALCIUM SERPL-MCNC: 10.1 MG/DL (ref 8.7–10.4)
CHLORIDE SERPL-SCNC: 101 MMOL/L (ref 99–111)
CONV ABS IMM GRAN: 0.07 10*3/UL (ref 0–0.2)
CONV CO2: 26 MMOL/L (ref 22–32)
CONV IMMATURE GRAN: 1.2 % (ref 0–1.8)
CONV TOTAL PROTEIN: 7.4 G/DL (ref 6.3–8.2)
CREAT UR-MCNC: 1.87 MG/DL (ref 0.7–1.2)
DEPRECATED RDW RBC AUTO: 69.5 FL (ref 35.1–43.9)
EOSINOPHIL # BLD AUTO: 0.12 10*3/UL (ref 0–0.7)
EOSINOPHIL # BLD AUTO: 2 % (ref 0–7)
ERYTHROCYTE [DISTWIDTH] IN BLOOD BY AUTOMATED COUNT: 18.4 % (ref 11.6–14.4)
EST. AVERAGE GLUCOSE BLD GHB EST-MCNC: 151 MG/DL
FERRITIN SERPL-MCNC: 1098 NG/ML (ref 30–300)
GFR SERPLBLD BASED ON 1.73 SQ M-ARVRAT: 32 ML/MIN/{1.73_M2}
GLOBULIN UR ELPH-MCNC: 3.8 G/DL (ref 2–3.5)
GLUCOSE SERPL-MCNC: 156 MG/DL (ref 70–99)
HBA1C MFR BLD: 6.9 % (ref 3.5–5.7)
HCT VFR BLD AUTO: 29.2 % (ref 42–52)
HGB BLD-MCNC: 9.1 G/DL (ref 14–18)
IRON SATN MFR SERPL: 20 % (ref 20–55)
IRON SERPL-MCNC: 47 UG/DL (ref 70–180)
LYMPHOCYTES # BLD AUTO: 1.29 10*3/UL (ref 1–5)
LYMPHOCYTES NFR BLD AUTO: 22 % (ref 20–45)
MCH RBC QN AUTO: 32.6 PG (ref 27–31)
MCHC RBC AUTO-ENTMCNC: 31.2 G/DL (ref 33–37)
MCV RBC AUTO: 104.7 FL (ref 80–96)
MONOCYTES # BLD AUTO: 1.24 10*3/UL (ref 0.2–1.2)
MONOCYTES NFR BLD AUTO: 21.2 % (ref 3–10)
NEUTROPHILS # BLD AUTO: 3.12 10*3/UL (ref 2–8)
NEUTROPHILS NFR BLD AUTO: 53.3 % (ref 30–85)
NRBC CBCN: 0 % (ref 0–0.7)
OSMOLALITY SERPL CALC.SUM OF ELEC: 303 MOSM/KG (ref 273–304)
PLATELET # BLD AUTO: 80 10*3/UL (ref 130–400)
PMV BLD AUTO: 10.7 FL (ref 9.4–12.4)
POTASSIUM SERPL-SCNC: 4.5 MMOL/L (ref 3.5–5.3)
RBC # BLD AUTO: 2.79 10*6/UL (ref 4.7–6.1)
SODIUM SERPL-SCNC: 141 MMOL/L (ref 135–147)
TIBC SERPL-MCNC: 235 UG/DL (ref 245–450)
TRANSFERRIN SERPL-MCNC: 164 MG/DL (ref 215–365)
TSH SERPL-ACNC: 0.28 M[IU]/L (ref 0.27–4.2)
WBC # BLD AUTO: 5.86 10*3/UL (ref 4.8–10.8)

## 2020-01-27 ENCOUNTER — OFFICE VISIT CONVERTED (OUTPATIENT)
Dept: UROLOGY | Facility: CLINIC | Age: 85
End: 2020-01-27
Attending: UROLOGY

## 2020-01-27 ENCOUNTER — CONVERSION ENCOUNTER (OUTPATIENT)
Dept: SURGERY | Facility: CLINIC | Age: 85
End: 2020-01-27

## 2020-01-28 ENCOUNTER — OFFICE VISIT CONVERTED (OUTPATIENT)
Dept: FAMILY MEDICINE CLINIC | Age: 85
End: 2020-01-28
Attending: FAMILY MEDICINE

## 2020-01-28 ENCOUNTER — HOSPITAL ENCOUNTER (OUTPATIENT)
Dept: OTHER | Facility: HOSPITAL | Age: 85
Discharge: HOME | End: 2020-01-28
Attending: FAMILY MEDICINE

## 2020-01-28 LAB
ALBUMIN SERPL-MCNC: 3.7 G/DL (ref 3.5–5)
ALBUMIN/GLOB SERPL: 1 {RATIO} (ref 1.4–2.6)
ALP SERPL-CCNC: 97 U/L (ref 56–155)
ALT SERPL-CCNC: 20 U/L (ref 10–40)
ANION GAP SERPL CALC-SCNC: 19 MMOL/L (ref 8–19)
AST SERPL-CCNC: 25 U/L (ref 15–50)
BASOPHILS # BLD AUTO: 0.02 10*3/UL (ref 0–0.2)
BASOPHILS NFR BLD AUTO: 0.3 % (ref 0–3)
BILIRUB SERPL-MCNC: 0.51 MG/DL (ref 0.2–1.3)
BUN SERPL-MCNC: 38 MG/DL (ref 5–25)
BUN/CREAT SERPL: 18 {RATIO} (ref 6–20)
CALCIUM SERPL-MCNC: 10.1 MG/DL (ref 8.7–10.4)
CHLORIDE SERPL-SCNC: 100 MMOL/L (ref 99–111)
CONV ABS IMM GRAN: 0.02 10*3/UL (ref 0–0.2)
CONV CO2: 26 MMOL/L (ref 22–32)
CONV IMMATURE GRAN: 0.3 % (ref 0–1.8)
CONV TOTAL PROTEIN: 7.5 G/DL (ref 6.3–8.2)
CREAT UR-MCNC: 2.1 MG/DL (ref 0.7–1.2)
DEPRECATED RDW RBC AUTO: 64.5 FL (ref 35.1–43.9)
EOSINOPHIL # BLD AUTO: 0.12 10*3/UL (ref 0–0.7)
EOSINOPHIL # BLD AUTO: 2 % (ref 0–7)
ERYTHROCYTE [DISTWIDTH] IN BLOOD BY AUTOMATED COUNT: 17.2 % (ref 11.6–14.4)
GFR SERPLBLD BASED ON 1.73 SQ M-ARVRAT: 28 ML/MIN/{1.73_M2}
GLOBULIN UR ELPH-MCNC: 3.8 G/DL (ref 2–3.5)
GLUCOSE SERPL-MCNC: 152 MG/DL (ref 70–99)
HCT VFR BLD AUTO: 33.5 % (ref 42–52)
HGB BLD-MCNC: 10.1 G/DL (ref 14–18)
LYMPHOCYTES # BLD AUTO: 1.28 10*3/UL (ref 1–5)
LYMPHOCYTES NFR BLD AUTO: 21.7 % (ref 20–45)
MCH RBC QN AUTO: 31.3 PG (ref 27–31)
MCHC RBC AUTO-ENTMCNC: 30.1 G/DL (ref 33–37)
MCV RBC AUTO: 103.7 FL (ref 80–96)
MONOCYTES # BLD AUTO: 0.96 10*3/UL (ref 0.2–1.2)
MONOCYTES NFR BLD AUTO: 16.3 % (ref 3–10)
NEUTROPHILS # BLD AUTO: 3.49 10*3/UL (ref 2–8)
NEUTROPHILS NFR BLD AUTO: 59.4 % (ref 30–85)
NRBC CBCN: 0 % (ref 0–0.7)
OSMOLALITY SERPL CALC.SUM OF ELEC: 302 MOSM/KG (ref 273–304)
PLATELET # BLD AUTO: 88 10*3/UL (ref 130–400)
PMV BLD AUTO: 11.2 FL (ref 9.4–12.4)
POTASSIUM SERPL-SCNC: 4.7 MMOL/L (ref 3.5–5.3)
RBC # BLD AUTO: 3.23 10*6/UL (ref 4.7–6.1)
RETICS # AUTO: 0.05 10*6/UL (ref 0.03–0.1)
RETICS/RBC NFR AUTO: 1.7 % (ref 0.51–1.81)
SODIUM SERPL-SCNC: 140 MMOL/L (ref 135–147)
WBC # BLD AUTO: 5.89 10*3/UL (ref 4.8–10.8)

## 2020-01-29 ENCOUNTER — CONVERSION ENCOUNTER (OUTPATIENT)
Dept: SURGERY | Facility: CLINIC | Age: 85
End: 2020-01-29

## 2020-01-29 LAB
FERRITIN SERPL-MCNC: 1362 NG/ML (ref 30–300)
IRON SATN MFR SERPL: 36 % (ref 20–55)
IRON SERPL-MCNC: 90 UG/DL (ref 70–180)
TIBC SERPL-MCNC: 247 UG/DL (ref 245–450)
TRANSFERRIN SERPL-MCNC: 173 MG/DL (ref 215–365)

## 2020-01-31 ENCOUNTER — OFFICE VISIT CONVERTED (OUTPATIENT)
Dept: UROLOGY | Facility: CLINIC | Age: 85
End: 2020-01-31
Attending: UROLOGY

## 2020-01-31 ENCOUNTER — HOSPITAL ENCOUNTER (OUTPATIENT)
Dept: SURGERY | Facility: CLINIC | Age: 85
Discharge: HOME | End: 2020-01-31
Attending: UROLOGY

## 2020-02-02 LAB — BACTERIA UR CULT: NORMAL

## 2020-02-18 ENCOUNTER — CONVERSION ENCOUNTER (OUTPATIENT)
Dept: PERIOP | Facility: HOSPITAL | Age: 85
End: 2020-02-18

## 2020-02-18 LAB
BASE EXCESS BLD CALC-SCNC: -3.9 MMOL/L
CHLORIDE BLDA-SCNC: 110 MMOL/L (ref 98–106)
COHGB MFR BLD: 0.9 % (ref 0–1.5)
CONV ALLEN'S TEST: ABNORMAL
CONV FHHB: 15.5 % (ref 0–5)
CONV FIO2: 100 % (ref 21–100)
CONV PEEP: 0
CONV POC IONIZED CALCIUM: 1.41 MMOL/L (ref 1.13–1.32)
CONV RATE: 20
CONV SET TIDAL VOLUME: 450
CONV SITE: ABNORMAL
CONV VENTILATOR MODE: AC
D-LACTATE SERPL-SCNC: 1.99 MMOL/L (ref 0.5–2)
GLUCOSE BLD-MCNC: 188 MG/DL (ref 70–99)
HBA1C MFR BLD: 10.1 % (ref 13.8–16.4)
HCO3 BLDA-SCNC: 25.3 MMOL/L (ref 22–26)
LABORATORY COMMENT REPORT: ABNORMAL
LITERS PER MINUTE: 0 L/MIN
Lab: ABNORMAL
METHGB MFR BLD: 0.1 % (ref 0–1.5)
OXYHGB MFR BLD: 83.5 % (ref 94–98)
PCO2 BLD: 70.9 MM[HG] (ref 35–45)
PH UR: 7.17 [PH] (ref 7.35–7.45)
PO2 BLD: 60 MM[HG] (ref 0–500)
PO2 BLD: 60.1 MM[HG] (ref 80–100)
POTASSIUM BLDA-SCNC: 4.9 MMOL/L (ref 3.5–5)
PS: 5
SAO2 % BLDCOA: 84.3 % (ref 95–99)
SODIUM BLD-SCNC: 139.4 MMOL/L (ref 136–146)
SPECIMEN SOURCE: ABNORMAL
SPO2: 96 MM[HG] (ref 21–100)

## 2020-03-01 ENCOUNTER — LAB REQUISITION (OUTPATIENT)
Dept: LAB | Facility: HOSPITAL | Age: 85
End: 2020-03-01

## 2020-03-01 DIAGNOSIS — Z00.00 ROUTINE GENERAL MEDICAL EXAMINATION AT A HEALTH CARE FACILITY: ICD-10-CM

## 2020-03-01 LAB
ANION GAP SERPL CALCULATED.3IONS-SCNC: 10.7 MMOL/L (ref 5–15)
BASOPHILS # BLD AUTO: 0.02 10*3/MM3 (ref 0–0.2)
BASOPHILS NFR BLD AUTO: 0.3 % (ref 0–1.5)
BUN BLD-MCNC: 52 MG/DL (ref 8–23)
BUN/CREAT SERPL: 17.9 (ref 7–25)
CALCIUM SPEC-SCNC: 8.6 MG/DL (ref 8.6–10.5)
CHLORIDE SERPL-SCNC: 101 MMOL/L (ref 98–107)
CO2 SERPL-SCNC: 26.3 MMOL/L (ref 22–29)
CREAT BLD-MCNC: 2.91 MG/DL (ref 0.76–1.27)
DEPRECATED RDW RBC AUTO: 60.2 FL (ref 37–54)
EOSINOPHIL # BLD AUTO: 0.14 10*3/MM3 (ref 0–0.4)
EOSINOPHIL NFR BLD AUTO: 1.8 % (ref 0.3–6.2)
ERYTHROCYTE [DISTWIDTH] IN BLOOD BY AUTOMATED COUNT: 17.2 % (ref 12.3–15.4)
GFR SERPL CREATININE-BSD FRML MDRD: 21 ML/MIN/1.73
GFR SERPL CREATININE-BSD FRML MDRD: 25 ML/MIN/1.73
GLUCOSE BLD-MCNC: 150 MG/DL (ref 65–99)
HCT VFR BLD AUTO: 23 % (ref 37.5–51)
HGB BLD-MCNC: 7.3 G/DL (ref 13–17.7)
IMM GRANULOCYTES # BLD AUTO: 0.3 10*3/MM3 (ref 0–0.05)
IMM GRANULOCYTES NFR BLD AUTO: 3.9 % (ref 0–0.5)
LYMPHOCYTES # BLD AUTO: 1.1 10*3/MM3 (ref 0.7–3.1)
LYMPHOCYTES NFR BLD AUTO: 14.4 % (ref 19.6–45.3)
MCH RBC QN AUTO: 30.8 PG (ref 26.6–33)
MCHC RBC AUTO-ENTMCNC: 31.7 G/DL (ref 31.5–35.7)
MCV RBC AUTO: 97 FL (ref 79–97)
MONOCYTES # BLD AUTO: 1.04 10*3/MM3 (ref 0.1–0.9)
MONOCYTES NFR BLD AUTO: 13.6 % (ref 5–12)
NEUTROPHILS # BLD AUTO: 5.02 10*3/MM3 (ref 1.7–7)
NEUTROPHILS NFR BLD AUTO: 66 % (ref 42.7–76)
NRBC BLD AUTO-RTO: 0.4 /100 WBC (ref 0–0.2)
NT-PROBNP SERPL-MCNC: ABNORMAL PG/ML (ref 5–1800)
PLATELET # BLD AUTO: 67 10*3/MM3 (ref 140–450)
PMV BLD AUTO: 10.6 FL (ref 6–12)
POTASSIUM BLD-SCNC: 4.6 MMOL/L (ref 3.5–5.2)
RBC # BLD AUTO: 2.37 10*6/MM3 (ref 4.14–5.8)
SODIUM BLD-SCNC: 138 MMOL/L (ref 136–145)
WBC NRBC COR # BLD: 7.62 10*3/MM3 (ref 3.4–10.8)

## 2020-03-01 PROCEDURE — 83880 ASSAY OF NATRIURETIC PEPTIDE: CPT

## 2020-03-01 PROCEDURE — 80048 BASIC METABOLIC PNL TOTAL CA: CPT

## 2020-03-01 PROCEDURE — 85025 COMPLETE CBC W/AUTO DIFF WBC: CPT

## 2020-03-15 ENCOUNTER — LAB REQUISITION (OUTPATIENT)
Dept: LAB | Facility: HOSPITAL | Age: 85
End: 2020-03-15

## 2020-03-15 DIAGNOSIS — Z00.00 ROUTINE GENERAL MEDICAL EXAMINATION AT A HEALTH CARE FACILITY: ICD-10-CM

## 2020-03-15 LAB
ANION GAP SERPL CALCULATED.3IONS-SCNC: 12.9 MMOL/L (ref 5–15)
ANISOCYTOSIS BLD QL: ABNORMAL
BASO STIPL COARSE BLD QL SMEAR: ABNORMAL
BUN BLD-MCNC: 63 MG/DL (ref 8–23)
BUN/CREAT SERPL: 16.5 (ref 7–25)
CALCIUM SPEC-SCNC: 8.4 MG/DL (ref 8.6–10.5)
CHLORIDE SERPL-SCNC: 99 MMOL/L (ref 98–107)
CO2 SERPL-SCNC: 27.1 MMOL/L (ref 22–29)
CREAT BLD-MCNC: 3.81 MG/DL (ref 0.76–1.27)
DEPRECATED RDW RBC AUTO: 68.3 FL (ref 37–54)
EOSINOPHIL # BLD MANUAL: 0.13 10*3/MM3 (ref 0–0.4)
EOSINOPHIL NFR BLD MANUAL: 2.2 % (ref 0.3–6.2)
ERYTHROCYTE [DISTWIDTH] IN BLOOD BY AUTOMATED COUNT: 19.2 % (ref 12.3–15.4)
GFR SERPL CREATININE-BSD FRML MDRD: 15 ML/MIN/1.73
GFR SERPL CREATININE-BSD FRML MDRD: 18 ML/MIN/1.73
GLUCOSE BLD-MCNC: 126 MG/DL (ref 65–99)
HCT VFR BLD AUTO: 23.4 % (ref 37.5–51)
HGB BLD-MCNC: 7.4 G/DL (ref 13–17.7)
HYPOCHROMIA BLD QL: ABNORMAL
LYMPHOCYTES # BLD MANUAL: 0.33 10*3/MM3 (ref 0.7–3.1)
LYMPHOCYTES NFR BLD MANUAL: 13 % (ref 5–12)
LYMPHOCYTES NFR BLD MANUAL: 5.4 % (ref 19.6–45.3)
MCH RBC QN AUTO: 31.4 PG (ref 26.6–33)
MCHC RBC AUTO-ENTMCNC: 31.6 G/DL (ref 31.5–35.7)
MCV RBC AUTO: 99.2 FL (ref 79–97)
MONOCYTES # BLD AUTO: 0.79 10*3/MM3 (ref 0.1–0.9)
NEUTROPHILS # BLD AUTO: 4.8 10*3/MM3 (ref 1.7–7)
NEUTROPHILS NFR BLD MANUAL: 79.3 % (ref 42.7–76)
PLAT MORPH BLD: NORMAL
PLATELET # BLD AUTO: 70 10*3/MM3 (ref 140–450)
PMV BLD AUTO: 10.9 FL (ref 6–12)
POLYCHROMASIA BLD QL SMEAR: ABNORMAL
POTASSIUM BLD-SCNC: 5.8 MMOL/L (ref 3.5–5.2)
RBC # BLD AUTO: 2.36 10*6/MM3 (ref 4.14–5.8)
SODIUM BLD-SCNC: 139 MMOL/L (ref 136–145)
WBC MORPH BLD: NORMAL
WBC NRBC COR # BLD: 6.05 10*3/MM3 (ref 3.4–10.8)

## 2020-03-15 PROCEDURE — 80048 BASIC METABOLIC PNL TOTAL CA: CPT

## 2020-03-15 PROCEDURE — 85025 COMPLETE CBC W/AUTO DIFF WBC: CPT

## 2020-03-27 ENCOUNTER — OFFICE VISIT CONVERTED (OUTPATIENT)
Dept: FAMILY MEDICINE CLINIC | Age: 85
End: 2020-03-27
Attending: FAMILY MEDICINE

## 2020-03-31 ENCOUNTER — HOSPITAL ENCOUNTER (OUTPATIENT)
Dept: OTHER | Facility: HOSPITAL | Age: 85
Discharge: HOME | End: 2020-03-31
Attending: INTERNAL MEDICINE

## 2020-03-31 LAB
ALBUMIN SERPL-MCNC: 3.6 G/DL (ref 3.5–5)
ALBUMIN/GLOB SERPL: 1 {RATIO} (ref 1.4–2.6)
ALP SERPL-CCNC: 86 U/L (ref 56–155)
ALT SERPL-CCNC: 13 U/L (ref 10–40)
ANION GAP SERPL CALC-SCNC: 18 MMOL/L (ref 8–19)
AST SERPL-CCNC: 18 U/L (ref 15–50)
BASOPHILS # BLD AUTO: 0.01 10*3/UL (ref 0–0.2)
BASOPHILS NFR BLD AUTO: 0.3 % (ref 0–3)
BILIRUB SERPL-MCNC: 0.33 MG/DL (ref 0.2–1.3)
BUN SERPL-MCNC: 73 MG/DL (ref 5–25)
BUN/CREAT SERPL: 17 {RATIO} (ref 6–20)
CALCIUM SERPL-MCNC: 9.4 MG/DL (ref 8.7–10.4)
CHLORIDE SERPL-SCNC: 105 MMOL/L (ref 99–111)
CONV ABS IMM GRAN: 0.05 10*3/UL (ref 0–0.2)
CONV CO2: 25 MMOL/L (ref 22–32)
CONV IMMATURE GRAN: 1.3 % (ref 0–1.8)
CONV TOTAL PROTEIN: 7.3 G/DL (ref 6.3–8.2)
CREAT UR-MCNC: 4.22 MG/DL (ref 0.7–1.2)
DEPRECATED RDW RBC AUTO: 87.3 FL (ref 35.1–43.9)
EOSINOPHIL # BLD AUTO: 0.06 10*3/UL (ref 0–0.7)
EOSINOPHIL # BLD AUTO: 1.5 % (ref 0–7)
ERYTHROCYTE [DISTWIDTH] IN BLOOD BY AUTOMATED COUNT: 21.2 % (ref 11.6–14.4)
GFR SERPLBLD BASED ON 1.73 SQ M-ARVRAT: 12 ML/MIN/{1.73_M2}
GLOBULIN UR ELPH-MCNC: 3.7 G/DL (ref 2–3.5)
GLUCOSE SERPL-MCNC: 147 MG/DL (ref 70–99)
HCT VFR BLD AUTO: 28.4 % (ref 42–52)
HGB BLD-MCNC: 8.3 G/DL (ref 14–18)
LYMPHOCYTES # BLD AUTO: 0.75 10*3/UL (ref 1–5)
LYMPHOCYTES NFR BLD AUTO: 19.3 % (ref 20–45)
MCH RBC QN AUTO: 33.1 PG (ref 27–31)
MCHC RBC AUTO-ENTMCNC: 29.2 G/DL (ref 33–37)
MCV RBC AUTO: 113.1 FL (ref 80–96)
MONOCYTES # BLD AUTO: 0.75 10*3/UL (ref 0.2–1.2)
MONOCYTES NFR BLD AUTO: 19.3 % (ref 3–10)
NEUTROPHILS # BLD AUTO: 2.26 10*3/UL (ref 2–8)
NEUTROPHILS NFR BLD AUTO: 58.3 % (ref 30–85)
NRBC CBCN: 0 % (ref 0–0.7)
OSMOLALITY SERPL CALC.SUM OF ELEC: 318 MOSM/KG (ref 273–304)
PLAT MORPH BLD: NORMAL
PLATELET # BLD AUTO: 47 10*3/UL (ref 130–400)
PMV BLD AUTO: 11.4 FL (ref 9.4–12.4)
POTASSIUM SERPL-SCNC: 6.4 MMOL/L (ref 3.5–5.3)
RBC # BLD AUTO: 2.51 10*6/UL (ref 4.7–6.1)
SMALL PLATELETS BLD QL SMEAR: NORMAL
SODIUM SERPL-SCNC: 142 MMOL/L (ref 135–147)
WBC # BLD AUTO: 3.88 10*3/UL (ref 4.8–10.8)

## 2020-05-06 ENCOUNTER — OFFICE VISIT CONVERTED (OUTPATIENT)
Dept: FAMILY MEDICINE CLINIC | Age: 85
End: 2020-05-06
Attending: FAMILY MEDICINE

## 2020-05-07 ENCOUNTER — HOSPITAL ENCOUNTER (OUTPATIENT)
Dept: OTHER | Facility: HOSPITAL | Age: 85
Discharge: HOME | End: 2020-05-07
Attending: FAMILY MEDICINE

## 2020-05-07 LAB
ALBUMIN SERPL-MCNC: 3.8 G/DL (ref 3.5–5)
ALBUMIN/GLOB SERPL: 1 {RATIO} (ref 1.4–2.6)
ALP SERPL-CCNC: 115 U/L (ref 56–155)
ALT SERPL-CCNC: 11 U/L (ref 10–40)
ANION GAP SERPL CALC-SCNC: 18 MMOL/L (ref 8–19)
AST SERPL-CCNC: 17 U/L (ref 15–50)
BASOPHILS # BLD AUTO: 0.01 10*3/UL (ref 0–0.2)
BASOPHILS NFR BLD AUTO: 0.2 % (ref 0–3)
BILIRUB SERPL-MCNC: 0.39 MG/DL (ref 0.2–1.3)
BUN SERPL-MCNC: 29 MG/DL (ref 5–25)
BUN/CREAT SERPL: 10 {RATIO} (ref 6–20)
CALCIUM SERPL-MCNC: 9.2 MG/DL (ref 8.7–10.4)
CHLORIDE SERPL-SCNC: 98 MMOL/L (ref 99–111)
CONV ABS IMM GRAN: 0.04 10*3/UL (ref 0–0.2)
CONV CO2: 25 MMOL/L (ref 22–32)
CONV IMMATURE GRAN: 0.9 % (ref 0–1.8)
CONV TOTAL PROTEIN: 7.6 G/DL (ref 6.3–8.2)
CREAT UR-MCNC: 2.79 MG/DL (ref 0.7–1.2)
DEPRECATED RDW RBC AUTO: 76.5 FL (ref 35.1–43.9)
EOSINOPHIL # BLD AUTO: 0.04 10*3/UL (ref 0–0.7)
EOSINOPHIL # BLD AUTO: 0.9 % (ref 0–7)
ERYTHROCYTE [DISTWIDTH] IN BLOOD BY AUTOMATED COUNT: 19 % (ref 11.6–14.4)
GFR SERPLBLD BASED ON 1.73 SQ M-ARVRAT: 20 ML/MIN/{1.73_M2}
GLOBULIN UR ELPH-MCNC: 3.8 G/DL (ref 2–3.5)
GLUCOSE SERPL-MCNC: 110 MG/DL (ref 70–99)
HCT VFR BLD AUTO: 26.4 % (ref 42–52)
HGB BLD-MCNC: 8.1 G/DL (ref 14–18)
LYMPHOCYTES # BLD AUTO: 0.66 10*3/UL (ref 1–5)
LYMPHOCYTES NFR BLD AUTO: 14.7 % (ref 20–45)
MCH RBC QN AUTO: 33.6 PG (ref 27–31)
MCHC RBC AUTO-ENTMCNC: 30.7 G/DL (ref 33–37)
MCV RBC AUTO: 109.5 FL (ref 80–96)
MONOCYTES # BLD AUTO: 0.91 10*3/UL (ref 0.2–1.2)
MONOCYTES NFR BLD AUTO: 20.3 % (ref 3–10)
NEUTROPHILS # BLD AUTO: 2.83 10*3/UL (ref 2–8)
NEUTROPHILS NFR BLD AUTO: 63 % (ref 30–85)
NRBC CBCN: 0 % (ref 0–0.7)
OSMOLALITY SERPL CALC.SUM OF ELEC: 290 MOSM/KG (ref 273–304)
PLATELET # BLD AUTO: 64 10*3/UL (ref 130–400)
PMV BLD AUTO: 11.2 FL (ref 9.4–12.4)
POTASSIUM SERPL-SCNC: 4.2 MMOL/L (ref 3.5–5.3)
RBC # BLD AUTO: 2.41 10*6/UL (ref 4.7–6.1)
SODIUM SERPL-SCNC: 137 MMOL/L (ref 135–147)
TSH SERPL-ACNC: 1.66 M[IU]/L (ref 0.27–4.2)
WBC # BLD AUTO: 4.49 10*3/UL (ref 4.8–10.8)

## 2020-05-19 ENCOUNTER — TELEMEDICINE CONVERTED (OUTPATIENT)
Dept: CARDIOLOGY | Facility: CLINIC | Age: 85
End: 2020-05-19
Attending: INTERNAL MEDICINE

## 2020-06-04 ENCOUNTER — OFFICE VISIT CONVERTED (OUTPATIENT)
Dept: PODIATRY | Facility: CLINIC | Age: 85
End: 2020-06-04
Attending: PODIATRIST

## 2020-07-02 ENCOUNTER — OFFICE VISIT CONVERTED (OUTPATIENT)
Dept: FAMILY MEDICINE CLINIC | Age: 85
End: 2020-07-02
Attending: FAMILY MEDICINE

## 2020-07-02 ENCOUNTER — HOSPITAL ENCOUNTER (OUTPATIENT)
Dept: OTHER | Facility: HOSPITAL | Age: 85
Discharge: HOME | End: 2020-07-02
Attending: FAMILY MEDICINE

## 2020-07-02 LAB
ALBUMIN SERPL-MCNC: 3.6 G/DL (ref 3.5–5)
ALBUMIN/GLOB SERPL: 0.9 {RATIO} (ref 1.4–2.6)
ALP SERPL-CCNC: 105 U/L (ref 56–155)
ALT SERPL-CCNC: 14 U/L (ref 10–40)
ANION GAP SERPL CALC-SCNC: 21 MMOL/L (ref 8–19)
AST SERPL-CCNC: 20 U/L (ref 15–50)
BASOPHILS # BLD AUTO: 0.02 10*3/UL (ref 0–0.2)
BASOPHILS NFR BLD AUTO: 0.4 % (ref 0–3)
BILIRUB SERPL-MCNC: 0.41 MG/DL (ref 0.2–1.3)
BNP SERPL-MCNC: ABNORMAL PG/ML (ref 0–1800)
BUN SERPL-MCNC: 30 MG/DL (ref 5–25)
BUN/CREAT SERPL: 7 {RATIO} (ref 6–20)
CALCIUM SERPL-MCNC: 9.3 MG/DL (ref 8.7–10.4)
CHLORIDE SERPL-SCNC: 95 MMOL/L (ref 99–111)
CONV ABS IMM GRAN: 0.11 10*3/UL (ref 0–0.2)
CONV CO2: 24 MMOL/L (ref 22–32)
CONV IMMATURE GRAN: 2 % (ref 0–1.8)
CONV TOTAL PROTEIN: 7.4 G/DL (ref 6.3–8.2)
CREAT UR-MCNC: 4.35 MG/DL (ref 0.7–1.2)
DEPRECATED RDW RBC AUTO: 85.5 FL (ref 35.1–43.9)
EOSINOPHIL # BLD AUTO: 0.06 10*3/UL (ref 0–0.7)
EOSINOPHIL # BLD AUTO: 1.1 % (ref 0–7)
ERYTHROCYTE [DISTWIDTH] IN BLOOD BY AUTOMATED COUNT: 19.9 % (ref 11.6–14.4)
EST. AVERAGE GLUCOSE BLD GHB EST-MCNC: 111 MG/DL
FERRITIN SERPL-MCNC: 1270 NG/ML (ref 30–300)
FOLATE SERPL-MCNC: >20 NG/ML (ref 4.8–20)
GFR SERPLBLD BASED ON 1.73 SQ M-ARVRAT: 12 ML/MIN/{1.73_M2}
GLOBULIN UR ELPH-MCNC: 3.8 G/DL (ref 2–3.5)
GLUCOSE SERPL-MCNC: 77 MG/DL (ref 70–99)
HBA1C MFR BLD: 5.5 % (ref 3.5–5.7)
HCT VFR BLD AUTO: 24.3 % (ref 42–52)
HGB BLD-MCNC: 7.2 G/DL (ref 14–18)
IRON SATN MFR SERPL: 25 % (ref 20–55)
IRON SERPL-MCNC: 57 UG/DL (ref 70–180)
LYMPHOCYTES # BLD AUTO: 1.04 10*3/UL (ref 1–5)
LYMPHOCYTES NFR BLD AUTO: 18.8 % (ref 20–45)
MCH RBC QN AUTO: 35 PG (ref 27–31)
MCHC RBC AUTO-ENTMCNC: 29.6 G/DL (ref 33–37)
MCV RBC AUTO: 118 FL (ref 80–96)
MONOCYTES # BLD AUTO: 1.24 10*3/UL (ref 0.2–1.2)
MONOCYTES NFR BLD AUTO: 22.5 % (ref 3–10)
NEUTROPHILS # BLD AUTO: 3.05 10*3/UL (ref 2–8)
NEUTROPHILS NFR BLD AUTO: 55.2 % (ref 30–85)
NRBC CBCN: 0 % (ref 0–0.7)
OSMOLALITY SERPL CALC.SUM OF ELEC: 285 MOSM/KG (ref 273–304)
PLATELET # BLD AUTO: 51 10*3/UL (ref 130–400)
PMV BLD AUTO: 11.9 FL (ref 9.4–12.4)
POTASSIUM SERPL-SCNC: 4.6 MMOL/L (ref 3.5–5.3)
RBC # BLD AUTO: 2.06 10*6/UL (ref 4.7–6.1)
RETICS # AUTO: 0.07 10*6/UL (ref 0.03–0.1)
RETICS/RBC NFR AUTO: 3.5 % (ref 0.51–1.81)
SODIUM SERPL-SCNC: 135 MMOL/L (ref 135–147)
TIBC SERPL-MCNC: 230 UG/DL (ref 245–450)
TRANSFERRIN SERPL-MCNC: 161 MG/DL (ref 215–365)
TSH SERPL-ACNC: 0.31 M[IU]/L (ref 0.27–4.2)
VIT B12 SERPL-MCNC: 957 PG/ML (ref 211–911)
WBC # BLD AUTO: 5.52 10*3/UL (ref 4.8–10.8)

## 2020-07-15 ENCOUNTER — CONVERSION ENCOUNTER (OUTPATIENT)
Dept: CARDIOLOGY | Facility: CLINIC | Age: 85
End: 2020-07-15

## 2020-07-15 ENCOUNTER — OFFICE VISIT CONVERTED (OUTPATIENT)
Dept: CARDIOLOGY | Facility: CLINIC | Age: 85
End: 2020-07-15
Attending: INTERNAL MEDICINE

## 2020-07-16 ENCOUNTER — TELEPHONE CONVERTED (OUTPATIENT)
Dept: UROLOGY | Facility: CLINIC | Age: 85
End: 2020-07-16
Attending: UROLOGY

## 2020-08-04 ENCOUNTER — HOSPITAL ENCOUNTER (OUTPATIENT)
Dept: CARDIOLOGY | Facility: HOSPITAL | Age: 85
Discharge: HOME | End: 2020-08-04
Attending: SURGERY

## 2020-08-06 ENCOUNTER — HOSPITAL ENCOUNTER (OUTPATIENT)
Dept: PREADMISSION TESTING | Facility: HOSPITAL | Age: 85
Discharge: HOME | End: 2020-08-06
Attending: SURGERY

## 2020-08-08 LAB — SARS-COV-2 RNA SPEC QL NAA+PROBE: NOT DETECTED

## 2020-08-10 ENCOUNTER — HOSPITAL ENCOUNTER (OUTPATIENT)
Dept: OTHER | Facility: HOSPITAL | Age: 85
Discharge: HOME | End: 2020-08-10
Attending: SURGERY

## 2020-08-10 LAB
ABO GROUP BLD: NORMAL
ANION GAP SERPL CALC-SCNC: 16 MMOL/L (ref 8–19)
APTT BLD: 27.2 S (ref 22.2–34.2)
BASOPHILS # BLD AUTO: 0.01 10*3/UL (ref 0–0.2)
BASOPHILS NFR BLD AUTO: 0.1 % (ref 0–3)
BLD GP AB SCN SERPL QL: NORMAL
BLOOD GROUP ANTIBODIES SERPL: NORMAL
BUN SERPL-MCNC: 24 MG/DL (ref 5–25)
BUN/CREAT SERPL: 6 {RATIO} (ref 6–20)
CALCIUM SERPL-MCNC: 9.4 MG/DL (ref 8.7–10.4)
CHLORIDE SERPL-SCNC: 98 MMOL/L (ref 99–111)
CONV ABD CONTROL: NORMAL
CONV ABS IMM GRAN: 0.19 10*3/UL (ref 0–0.2)
CONV CO2: 27 MMOL/L (ref 22–32)
CONV IMMATURE GRAN: 2.5 % (ref 0–1.8)
CREAT UR-MCNC: 3.73 MG/DL (ref 0.7–1.2)
DEPRECATED RDW RBC AUTO: 82.8 FL (ref 35.1–43.9)
EOSINOPHIL # BLD AUTO: 0.04 10*3/UL (ref 0–0.7)
EOSINOPHIL # BLD AUTO: 0.5 % (ref 0–7)
ERYTHROCYTE [DISTWIDTH] IN BLOOD BY AUTOMATED COUNT: 20.3 % (ref 11.6–14.4)
GFR SERPLBLD BASED ON 1.73 SQ M-ARVRAT: 14 ML/MIN/{1.73_M2}
GLUCOSE SERPL-MCNC: 124 MG/DL (ref 70–99)
HCT VFR BLD AUTO: 26.4 % (ref 42–52)
HGB BLD-MCNC: 8.1 G/DL (ref 14–18)
INR PPP: 1.06 (ref 2–3)
LYMPHOCYTES # BLD AUTO: 0.68 10*3/UL (ref 1–5)
LYMPHOCYTES NFR BLD AUTO: 9 % (ref 20–45)
Lab: NORMAL
MCH RBC QN AUTO: 34.8 PG (ref 27–31)
MCHC RBC AUTO-ENTMCNC: 30.7 G/DL (ref 33–37)
MCV RBC AUTO: 113.3 FL (ref 80–96)
MONOCYTES # BLD AUTO: 1.37 10*3/UL (ref 0.2–1.2)
MONOCYTES NFR BLD AUTO: 18.1 % (ref 3–10)
NEUTROPHILS # BLD AUTO: 5.27 10*3/UL (ref 2–8)
NEUTROPHILS NFR BLD AUTO: 69.8 % (ref 30–85)
NRBC CBCN: 0 % (ref 0–0.7)
OSMOLALITY SERPL CALC.SUM OF ELEC: 289 MOSM/KG (ref 273–304)
PLATELET # BLD AUTO: 53 10*3/UL (ref 130–400)
PMV BLD AUTO: 11.6 FL (ref 9.4–12.4)
POTASSIUM SERPL-SCNC: 3.9 MMOL/L (ref 3.5–5.3)
PROTHROMBIN TIME: 11.3 S (ref 9.4–12)
RBC # BLD AUTO: 2.33 10*6/UL (ref 4.7–6.1)
RH BLD: NORMAL
SODIUM SERPL-SCNC: 137 MMOL/L (ref 135–147)
WBC # BLD AUTO: 7.56 10*3/UL (ref 4.8–10.8)

## 2020-08-11 ENCOUNTER — HOSPITAL ENCOUNTER (OUTPATIENT)
Dept: PERIOP | Facility: HOSPITAL | Age: 85
Setting detail: HOSPITAL OUTPATIENT SURGERY
Discharge: HOME | End: 2020-08-11
Attending: SURGERY

## 2020-08-11 LAB
ABO GROUP BLD: NORMAL
ANION GAP SERPL CALC-SCNC: 20 MMOL/L (ref 8–19)
APTT BLD: 26.5 S (ref 22.2–34.2)
BASOPHILS # BLD AUTO: 0.02 10*3/UL (ref 0–0.2)
BASOPHILS NFR BLD AUTO: 0.3 % (ref 0–3)
BLD GP AB SCN SERPL QL: NORMAL
BLOOD GROUP ANTIBODIES SERPL: NORMAL
BUN SERPL-MCNC: 35 MG/DL (ref 5–25)
BUN/CREAT SERPL: 7 {RATIO} (ref 6–20)
CALCIUM SERPL-MCNC: 9.9 MG/DL (ref 8.7–10.4)
CHLORIDE SERPL-SCNC: 101 MMOL/L (ref 99–111)
CONV ABD CONTROL: NORMAL
CONV ABS IMM GRAN: 0.24 10*3/UL (ref 0–0.2)
CONV CO2: 23 MMOL/L (ref 22–32)
CONV IMMATURE GRAN: 4.1 % (ref 0–1.8)
CREAT UR-MCNC: 5.21 MG/DL (ref 0.7–1.2)
DAT C3: NORMAL
DAT POLY-SP REAG RBC QL: NORMAL
DEPRECATED RDW RBC AUTO: 89.2 FL (ref 35.1–43.9)
EOSINOPHIL # BLD AUTO: 0.06 10*3/UL (ref 0–0.7)
EOSINOPHIL # BLD AUTO: 1 % (ref 0–7)
ERYTHROCYTE [DISTWIDTH] IN BLOOD BY AUTOMATED COUNT: 20.8 % (ref 11.6–14.4)
GFR SERPLBLD BASED ON 1.73 SQ M-ARVRAT: 9 ML/MIN/{1.73_M2}
GLUCOSE BLD-MCNC: 89 MG/DL (ref 70–99)
GLUCOSE SERPL-MCNC: 111 MG/DL (ref 70–99)
HCT VFR BLD AUTO: 28 % (ref 42–52)
HGB BLD-MCNC: 8.4 G/DL (ref 14–18)
INR PPP: 1.19 (ref 2–3)
LYMPHOCYTES # BLD AUTO: 0.9 10*3/UL (ref 1–5)
LYMPHOCYTES NFR BLD AUTO: 15.3 % (ref 20–45)
MCH RBC QN AUTO: 35.1 PG (ref 27–31)
MCHC RBC AUTO-ENTMCNC: 30 G/DL (ref 33–37)
MCV RBC AUTO: 117.2 FL (ref 80–96)
MONOCYTES # BLD AUTO: 1.33 10*3/UL (ref 0.2–1.2)
MONOCYTES NFR BLD AUTO: 22.6 % (ref 3–10)
NEUTROPHILS # BLD AUTO: 3.33 10*3/UL (ref 2–8)
NEUTROPHILS NFR BLD AUTO: 56.7 % (ref 30–85)
NRBC CBCN: 0 % (ref 0–0.7)
OSMOLALITY SERPL CALC.SUM OF ELEC: 299 MOSM/KG (ref 273–304)
PLATELET # BLD AUTO: 52 10*3/UL (ref 130–400)
PMV BLD AUTO: 12 FL (ref 9.4–12.4)
POTASSIUM SERPL-SCNC: 4.4 MMOL/L (ref 3.5–5.3)
PROTHROMBIN TIME: 12.5 S (ref 9.4–12)
RBC # BLD AUTO: 2.39 10*6/UL (ref 4.7–6.1)
RH BLD: NORMAL
SODIUM SERPL-SCNC: 140 MMOL/L (ref 135–147)
WBC # BLD AUTO: 5.88 10*3/UL (ref 4.8–10.8)

## 2020-08-25 ENCOUNTER — HOSPITAL ENCOUNTER (OUTPATIENT)
Dept: CARDIOLOGY | Facility: HOSPITAL | Age: 85
Discharge: HOME | End: 2020-08-25
Attending: SURGERY

## 2020-09-03 ENCOUNTER — PROCEDURE VISIT CONVERTED (OUTPATIENT)
Dept: PODIATRY | Facility: CLINIC | Age: 85
End: 2020-09-03
Attending: PODIATRIST

## 2020-09-03 ENCOUNTER — CONVERSION ENCOUNTER (OUTPATIENT)
Dept: PODIATRY | Facility: CLINIC | Age: 85
End: 2020-09-03

## 2020-09-24 ENCOUNTER — OFFICE VISIT CONVERTED (OUTPATIENT)
Dept: PULMONOLOGY | Facility: CLINIC | Age: 85
End: 2020-09-24
Attending: NURSE PRACTITIONER

## 2020-09-29 ENCOUNTER — OFFICE VISIT CONVERTED (OUTPATIENT)
Dept: FAMILY MEDICINE CLINIC | Age: 85
End: 2020-09-29
Attending: FAMILY MEDICINE

## 2020-10-01 ENCOUNTER — HOSPITAL ENCOUNTER (OUTPATIENT)
Dept: OTHER | Facility: HOSPITAL | Age: 85
Discharge: HOME | End: 2020-10-01
Attending: FAMILY MEDICINE

## 2020-10-01 LAB
ALBUMIN SERPL-MCNC: 3.8 G/DL (ref 3.5–5)
ALBUMIN/GLOB SERPL: 1.1 {RATIO} (ref 1.4–2.6)
ALP SERPL-CCNC: 135 U/L (ref 56–155)
ALT SERPL-CCNC: 11 U/L (ref 10–40)
ANION GAP SERPL CALC-SCNC: 18 MMOL/L (ref 8–19)
AST SERPL-CCNC: 17 U/L (ref 15–50)
BILIRUB SERPL-MCNC: 0.47 MG/DL (ref 0.2–1.3)
BNP SERPL-MCNC: ABNORMAL PG/ML (ref 0–1800)
BUN SERPL-MCNC: 30 MG/DL (ref 5–25)
BUN/CREAT SERPL: 6 {RATIO} (ref 6–20)
CALCIUM SERPL-MCNC: 9.3 MG/DL (ref 8.7–10.4)
CHLORIDE SERPL-SCNC: 97 MMOL/L (ref 99–111)
CONV CO2: 26 MMOL/L (ref 22–32)
CONV TOTAL PROTEIN: 7.4 G/DL (ref 6.3–8.2)
CREAT UR-MCNC: 5.09 MG/DL (ref 0.7–1.2)
EST. AVERAGE GLUCOSE BLD GHB EST-MCNC: 103 MG/DL
FERRITIN SERPL-MCNC: 1367 NG/ML (ref 30–300)
GFR SERPLBLD BASED ON 1.73 SQ M-ARVRAT: 10 ML/MIN/{1.73_M2}
GLOBULIN UR ELPH-MCNC: 3.6 G/DL (ref 2–3.5)
GLUCOSE SERPL-MCNC: 203 MG/DL (ref 70–99)
HBA1C MFR BLD: 5.2 % (ref 3.5–5.7)
IRON SATN MFR SERPL: 37 % (ref 20–55)
IRON SERPL-MCNC: 100 UG/DL (ref 70–180)
OSMOLALITY SERPL CALC.SUM OF ELEC: 294 MOSM/KG (ref 273–304)
POTASSIUM SERPL-SCNC: 4.7 MMOL/L (ref 3.5–5.3)
RBC # BLD AUTO: 2.06 10*6/UL (ref 4.7–6.1)
RETICS # AUTO: 0.09 10*6/UL (ref 0.03–0.1)
RETICS/RBC NFR AUTO: 4.5 % (ref 0.51–1.81)
SODIUM SERPL-SCNC: 136 MMOL/L (ref 135–147)
TIBC SERPL-MCNC: 270 UG/DL (ref 245–450)
TRANSFERRIN SERPL-MCNC: 189 MG/DL (ref 215–365)
TSH SERPL-ACNC: 1.37 M[IU]/L (ref 0.27–4.2)
WBC # BLD AUTO: 6.42 10*3/UL (ref 4.8–10.8)

## 2020-10-02 LAB
BASOPHILS # BLD AUTO: 0.01 10*3/UL (ref 0–0.2)
BASOPHILS # BLD: 0 % (ref 0–3)
BASOPHILS NFR BLD AUTO: 0.2 % (ref 0–3)
BURR CELLS BLD QL SMEAR: ABNORMAL
CONV ABS BANDS: 2 % (ref 1–5)
CONV ABS IMM GRAN: 0.24 10*3/UL (ref 0–0.2)
CONV ANISOCYTES: SLIGHT
CONV ATYPICAL LYMPHOCYTES: 6 % (ref 0–5)
CONV HYPERSEGMENTED NEUTROPHILS IN BLOOD BY LIGHT MICROSCOPY: ABNORMAL
CONV HYPOCHROMIA IN BLOOD BY LIGHT MICROSCOPY: SLIGHT
CONV IMMATURE GRAN: 3.7 % (ref 0–1.8)
CONV SEGMENTED NEUTROPHILS: 64 % (ref 45–70)
DACRYOCYTES BLD QL SMEAR: SLIGHT
DEPRECATED RDW RBC AUTO: 99.1 FL (ref 35.1–43.9)
EOSINOPHIL # BLD AUTO: 0.07 10*3/UL (ref 0–0.7)
EOSINOPHIL # BLD AUTO: 1.1 % (ref 0–7)
EOSINOPHIL NFR BLD AUTO: 4 % (ref 0–7)
ERYTHROCYTE [DISTWIDTH] IN BLOOD BY AUTOMATED COUNT: 22.2 % (ref 11.6–14.4)
FOLATE SERPL-MCNC: >20 NG/ML (ref 4.8–20)
HCT VFR BLD AUTO: 24.9 % (ref 42–52)
HGB BLD-MCNC: 7.3 G/DL (ref 14–18)
LARGE PLATELETS: ABNORMAL
LYMPHOCYTES # BLD AUTO: 1.05 10*3/UL (ref 1–5)
LYMPHOCYTES NFR BLD AUTO: 16.4 % (ref 20–45)
MACROCYTES BLD QL SMEAR: SLIGHT
MCH RBC QN AUTO: 35.4 PG (ref 27–31)
MCHC RBC AUTO-ENTMCNC: 29.3 G/DL (ref 33–37)
MCV RBC AUTO: 120.9 FL (ref 80–96)
MICROCYTES BLD QL: SLIGHT
MONOCYTES # BLD AUTO: 1.28 10*3/UL (ref 0.2–1.2)
MONOCYTES NFR BLD AUTO: 19.9 % (ref 3–10)
MONOCYTES NFR BLD MANUAL: 17 % (ref 3–10)
NEUTROPHILS # BLD AUTO: 3.77 10*3/UL (ref 2–8)
NEUTROPHILS NFR BLD AUTO: 58.7 % (ref 30–85)
NRBC CBCN: 0.3 % (ref 0–0.7)
NUC CELL # PRT MANUAL: 0 /100{WBCS}
OVALOCYTES BLD QL SMEAR: SLIGHT
PLAT MORPH BLD: ABNORMAL
PLATELET # BLD AUTO: 56 10*3/UL (ref 130–400)
PMV BLD AUTO: 12.9 FL (ref 9.4–12.4)
POIKILOCYTOSIS BLD QL SMEAR: SLIGHT
PROMYELOCYTES NFR BLD MANUAL: 2 %
ROULEAUX BLD QL SMEAR: ABNORMAL
SMALL PLATELETS BLD QL SMEAR: ABNORMAL
SMALL PLATELETS: SLIGHT
SPHEROCYTES BLD QL SMEAR: SLIGHT
STOMATOCYTES BLD QL SMEAR: SLIGHT
TARGETS BLD QL SMEAR: SLIGHT
VARIANT LYMPHS NFR BLD MANUAL: 5 % (ref 20–45)
VIT B12 SERPL-MCNC: 929 PG/ML (ref 211–911)

## 2021-05-10 NOTE — H&P
History and Physical      Patient Name: Fidel Cleary   Patient ID: [id number]   Sex: Male   YOB: 1935    Primary Care Provider: Rocco Haskins MD   Referring Provider: Rocco Haskins MD    Visit Date: June 4, 2020    Provider: Juanito Carlson DPM   Location: [facility]   Location Address: [address] [place] [address] [facility]  [address] [place] [address]  [id number]   Location Phone: [phone]          Chief Complaint  · Routine Foot Care Visit  · Diabetic Foot Evaluation      History Of Present Illness  Fidel Cleary complains of painful, elongated toenails which are thickened, yellowed, chalky, and cause pain with shoe gear and ambulation.   Fidel Cleary presents to the office today as a new patient for a diabetic foot evaluation. On referral from Rocco Haskins MD   Patient reports that he is a diabetic currently controlling diabetes with insulin      New, Established, New Problem: New  Location: Toenails  Duration:  Greater than five years  Onset: Gradual  Nature: sore with palpation.  Stable, worsening, improving: Worsening  Aggravating factors: Pain with shoe gear and ambulation.  Previous Treatment: His wife states that she has trouble in his toenails due to the thickness.    Patient denies any fevers, chills, nausea, vomiting, shortness of breathe, nor any other constitutional signs nor symptoms.          New, Established, New Problem:  new   Location:  bilateral feet  Duration:  10 years  Onset:  gradual  Nature:  IDDM  Stable, worsening, improving:  stable  Aggravating factors:  Previous Treatment:  insulin  Pt states their most recent blood glucose reading was 93.    Patient is on hemodialysis 3 times a week.    Patient relates relocating here recently along with having vascular procedures when he lived in [place] to his lower legs.       Past Medical History  Anemia, Unspecified; Arthritis; Bladder Cancer; Bladder Disorder; Cancer; Congestive heart failure; Diabetes;  Dialysis patient; Gout; Hammertoe; Heart Disease; High blood pressure; High cholesterol; Hyperthyroidism; Ingrown toenail; Kidney Disease; Limb Pain; Limb Swelling; Numbness in feet; Oxygen dependent; Renal Failure; Renal mass         Past Surgical History  Arm Surgery; cardiac stents; Colonoscopy; Cystoscopy with biopsy of bladder; EGD; Heart Bypass; Hemorrhoidectomy; Leg Surgery; Toe Surgery; TURBT; Vascular Surgery         Medication List  atorvastatin 40 mg oral tablet; famotidine 20 mg oral tablet; febuxostat oral; finasteride 5 mg oral tablet; Fish Oil 360-1,200 mg oral capsule; gabapentin 300 mg oral capsule; iron 325 mg (65 mg iron) oral tablet; Lantus U-100 Insulin 100 unit/mL subcutaneous solution; Lasix 40 mg oral tablet; multivitamin oral tablet; Nitrostat 0.4 mg sublingual tablet, sublingual; Oxygen Dependent; tamsulosin 0.4 mg oral capsule; Toprol XL 25 mg oral tablet extended release 24 hr; Vitamin C 250 mg oral tablet; Vitamin D3 4,000 unit oral capsule         Allergy List  Dust Mite; Molds; naproxen       Allergies Reconciled  Family Medical History  Family history of heart disease         Social History  Alcohol (Never); Second hand smoke exposure (Never); Tobacco (Former)         Review of Systems  · Constitutional  o Denies  o : fatigue, night sweats  · Eyes  o Denies  o : double vision, blurred vision  · HENT  o Denies  o : vertigo, recent head injury  · Cardiovascular  o Denies  o : chest pain, irregular heart beats  · Respiratory  o Denies  o : shortness of breath, productive cough  · Gastrointestinal  o Denies  o : nausea, vomiting  · Genitourinary  o Denies  o : dysuria, urinary retention  · Integument  o * See HPI  · Neurologic  o * See HPI  · Musculoskeletal  o * See HPI  · Endocrine  o Denies  o : cold intolerance, heat intolerance  · Heme-Lymph  o Denies  o : petechiae, lymph node enlargement or tenderness  · Allergic-Immunologic  o Denies  o : frequent illnesses      Vitals  Date Time  "BP Position Site L\R Cuff Size HR RR TEMP (F) WT  HT  BMI kg/m2 BSA m2 O2 Sat HC       06/04/2020 02:35 /78 Sitting    54 - R  98.9 214lbs 16oz 5'  9\" 31.75 2.18 91 %          Physical Examination  · Constitutional  o Appearance  o : well-nourished, well developed, no obvious deformities present, appears to be chronically ill   · Respiratory  o Respiratory Effort  o : No labored breathing. Good respiratory effort.   · Cardiovascular  o Peripheral Vascular System  o :   § Pedal Pulses  § : bilateral pulse strength absent   § Extremities  § : There is no edema of the lower extremities  · Musculoskeletal  o Extremeties/Joint  o : Lower extremity muscle strength and range of motion is equal and symmetrical bilaterally. The knees are noted to be in normal alignment. Well-healed amputation of the left second toe.  · Skin and Subcutaneous Tissue  o General Inspection  o : Atrophic skin changes seen throughout the lower extremity bilaterally.  · Neurologic  o Sensation  o : Sharp/dull sensation is absent bilaterally. Monofilament sensation examination of the left foot is absent. Monofilament sensation examination of the right foot is absent.   · Left DM Foot Exam  o Sensation  o : [place]-Anjali 5.07 monofilament absent to all assessed areas. Sharp/dull sensation is absent.   o Visual Inspection  o : Skin is noted to have normal texture and turgor, with no excrescences noted. The toenails are noted to be without disease  o Vascular  o : Delayed capillary refill to toes.  · Right DM Foot Exam  o Sensation  o : [place]-Anjali 5.07 monofilament absent to all assessed areas. Sharp/dull sensation is absent.   o Visual Inspection  o : Skin is noted to have normal texture and turgor, with no excrescences noted. The toenails are noted to be without disease  o Vascular  o : Delayed capillary refill to toes.  · Toes  o Toes: Right Foot  o :   § Toenails  § : Toenails are hypertrophic, mycotic, dystrophic, brittle toenail(s) " at nail 1, 2, 3, 4, 5 with onycholysis of the right foot. The 1st, 2nd, 3rd, 4th, 5th toenail(s) on the right have 2 mm in thickness with subungual detritus. There is an incurvated toenail at the distal border of the 1st, 2nd, 3rd, 4th, 5th toe  o Toes: Left Foot  o :   § Toenails  § : There are hypertrophic, mycotic, dystrophic, brittle toenail(s) at the 1, 3, 4, 5 with onycholysis of the left foot. The 1st, 3rd, 4th, 5th toenail(s) on the left have 2 mm in thickness with subungual detritus. There is an incurvated toenail at the distal border of the 1st, 3rd, 4th, 5th toe  · Procedures  o Nail Debridement  o : Nail debridement is indicated for the following toenails:, left hallux, left 3rd toe, left 4th toe, left 5th toe, right hallux, right 2nd toe, right 3rd toe, right 4th toe, right 5th toe. The nail was debrided of excessive thickness to appropriate levels of comfort and contour using, nail nippers. The procedure was without complications     Patient uses a cane for assistance with ambulation.  Patient using portable nasal oxygen.           Assessment  · Diabetes mellitus type 2, insulin dependent       Type 2 diabetes mellitus without complications     250.00/E11.9  Long term (current) use of insulin     250.00/Z79.4  · Diabetic neuropathy       Type 2 diabetes mellitus with diabetic neuropathy, unspecified     250.60/E11.40  · Foot pain, bilateral       Pain in right foot     729.5/M79.671  Pain in left foot     729.5/M79.672  · Ingrowing nail     703.0/L60.0  · Tinea unguium     110.1/B35.1  · Peripheral vascular disease     443.9/I73.9  · Dialysis patient     V45.11/Z99.2  · Toe amputee     V49.72/Z89.429  · PVD (peripheral vascular disease)     443.9/I73.9      Plan  · Orders  o Debridement of six or more nails (10673) - - 06/04/2020  o Diabetic Foot (Motor and Sensory) Exam Completed [facility] (, , 2028F) - - 06/04/2020  o VASCULAR SURGERY CONSULTATION ([facility]) - -  06/04/2020  · Medications  o Medications have been Reconciled  o Transition of Care or Provider Policy  · Instructions  o Follow Up in 9 weeks  o I have discussed the findings of this evaluation with the patient. The discussion included a complete verbal explanation of any changes in the examination results, diagnosis, and the current treatment plan. A schedule for future care needs was explained. If any questions should arise after returning home, I have encouraged the patient to feel free to contact Dr. Carlson. The patient states understanding and agreement with this plan.   o Pt to monitor for problems and to contact Dr. Carlson for follow-up should such signs occur. Patient states understanding and agreement with this plan.   o Onychomycosis is present in 2-3% of the population with the most common source of contamination coming from the patient's own skin. Fifteen to 20 percent of people between the ages of 40 and 60 have onychomycosis, 32 percent of 60- to 32-[id number] have nail fungus and approximately 50 percent of those over 70 are afflicted. Seventy-five percent of the people who have this infection exhibit psychosocial concerns. These people face the dilemma of not being able to go to the swimming pool, public shower areas or even wear open-toed sandals. More important is the fact that 48 percent of the people with onychomycosis have pain. The pain is intense enough to have these patients miss 1.8 days of work on average over a six-month period. Traditional topical therapies used alone are generally ineffective for clearing the primary infection, and even oral therapy is associated with a high rate of initial treatment failure or recurrence. In many patients combination oral and topical therapy is the treatment of choice.   o I have recommended debridement of the devitalized or contaminated tissue. Debridement will relieve the pressure of the necrotic presence of on the nail and provides for a better  cosmetic appearance. Debulking the nail does help in combination with other treatments in that it can decrease the fungal load of the nail itself.   o The need for a referral to another physician was discussed with the patient. They state understanding and agreement with this plan. The patient is to referred to [facility] vascular surgery group for evaluation.  o Diabetic foot exam performed and documented this date, compliant with CQM required standards. Detail of findings as noted in physical exam. Lower extremity Neurologic exam for diabetic patient performed and documented this date, compliant with PQRS required standards. Detail of findings as noted in physical exam. Advised patient importance of good routine lower extremity hygiene. Advised patient importance of evaluating for intact skin and pain free nail borders. Advised patient to use mirror to evaluate plantar/ soles of feet for better visualization. Advised patient monitor and phone office to be seen if any cracking to skin, open lesions, painful nail borders or if nails become elongated prior to next visit. Advised patient importance of daily cleansing of lower extremities, followed by good skin cream to maintain normal hydration of skin. Also advised patient importance of close daily monitoring of blood sugar. Advised to regulate diet and medications to maintain control of blood sugar in optimal range. Contact primary care provider if difficulties maintaining blood sugar levels. Advised Patient of presence of Diabetes Mellitus condition. Advised Patient risk of progression and worsening or improvement, then return of condition. Will monitor condition for any change in future. Treat with most appropriate treatment pending status of condition. Counseled and advised patient extensively on nature and ramifications of diabetes. Standard instructions given to patient for good diabetic foot care and maintenance. Advised importance of careful monitoring to avoid  break down and complications secondary to diabetes. Advised patient importance of strict maintenance of blood sugar control. Advised patient of possible ominous results from neglect of condition, i.e.: amputation/ loss of digits, feet and legs, or even death. Patient states understands counseling, will monitor closely, continue good hygiene and routine diabetic foot care. Patient will contact office is questions or problems.   o Electronically Identified Patient Education Materials Provided Electronically  · Disposition  o Call or Return if symptoms worsen or persist.            Electronically Signed by: Juanito Carlson DPM -Author on June 4, 2020 02:55:26 PM

## 2021-05-13 NOTE — PROGRESS NOTES
Quick Note      Patient Name: Fidel Cleary   Patient ID: [id number]   Sex: Male   YOB: 1935    Primary Care Provider: Rocco Haskins MD   Referring Provider: Rocco Haskins MD    Visit Date: May 19, 2020    Provider: Neil Amaya MD   Location: [facility]   Location Address: [address] [place] [address], [facility]   [place]  [id number]   Location Phone: [phone]          History Of Present Illness  TELEHEALTH TELEPHONE VISIT  Chief Complaint: Follow-up visit for coronary artery disease and congestive heart failure.   Fidel Cleary is a 85-year-old male who is presenting for evaluation via telehealth telephone visit. Telehealth visit performed due to COVID-19. Verbal consent obtained before beginning visit. The patient has coronary artery disease, previous coronary artery bypass grafting, congestive heart failure, and chronic kidney disease. The patient was last seen in the office on January 15, 2020. Currently, he is on dialysis Monday, Wednesday, and Friday. Shortness of breath is improved since dialysis was started. He still has some swelling. Denies any chest pain or palpitation. He is taking all the medicines as prescribed.   Provider spent 5 minutes with the patient during telehealth visit.   The following staff were present during this visit: Provider only.   Past Medical History/Overview of Patient Symptoms     PAST MEDICAL HISTORY:  (1) Coronary artery disease, status post angioplasty in 2003 and coronary artery bypass grafting in 2004. Last stress test per patient was in March 2019. (2) Chronic combined heart failure. Left ventricular ejection fraction 53% per echocardiogram on 09/03/2019. 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 Dumont. (4) Peripheral artery disease involving the left lower extremity. (5) Hypertension. (6) Hyperlipidemia. (7) Benign prostatic hypertrophy. (8) Hypothyroidism. (9) Diabetes mellitus, on insulin. (10) Renal mass.  "    PSYCHOSOCIAL HISTORY:  No history of mood change or depression. He does not drink alcohol and does not use tobacco.      CURRENT MEDICATIONS:  Metoprolol 12.5 mg b.i.d.; Atorvastatin 40 mg daily; vitamin D3; Finasteride 5 mg b.i.d.; vitamin C; Gabapentin 300 mg daily; Levothyroxine 0.175 mg daily; multivitamin; Pepcid; Tamsulosin 0.4 mg daily; Lasix b.i.d.; insulin; NTG p.r.n.; Uloric 40 mg daily. The dosage and frequency of the medications were reviewed with the patient.     REVIEW OF SYSTEMS: Positive for swelling.  Negative for chest pain, palpitations, shortness of breath, chronic or frequent coughs, asthma or wheezing.       Vitals     Per patient, at-home vitals:  Blood pressure 123,60, heart rate 63, weight 213, height 5'9\".           Assessment     ASSESSMENT AND PLAN:  1.  Chronic diastolic heart failure.  Shortness of breath improved once dialysis was started.  Lasix as prescribed        by nephrology.   2.  Coronary artery disease.  Previous bypass grafting, stable with no angina.  Continue statins.  The patient is        not on aspirin because of recurrent bleeding requiring blood transfusions.   3.  Follow up in four to six months.       MD INDRA Pineda/lydia    This note was transcribed by Angelic Salas.  lydia/indra  The above service was transcribed by Angelic Salas, and I attest to the accuracy of the note.  KASSANDRAV             Electronically Signed by: Krys Salas-, Other -Author on May 27, 2020 09:20:27 AM  Electronically Co-signed by: Neil Amaya MD -Reviewer on May 27, 2020 12:59:43 PM  "

## 2021-05-13 NOTE — PROGRESS NOTES
Progress Note      Patient Name: Fidel Cleary   Patient ID: [id number]   Sex: Male   YOB: 1935    Primary Care Provider: Rocco Haskins MD   Referring Provider: Rocco Haskins MD    Visit Date: September 3, 2020    Provider: Juanito Carlson DPM   Location: [facility]   Location Address: [address] [place] [address]  [address] [place] [address]  [id number]   Location Phone: [phone]          Chief Complaint  · Routine Foot Care Visit  · Diabetic Foot Evaluation      History Of Present Illness  Fidel Cleary complains of painful, elongated toenails which are thickened, yellowed, chalky, and cause pain with shoe gear and ambulation.   Fidel Cleary presents to the office today as a Follow-Up for a diabetic foot evaluation.   Patient reports that he is a diabetic currently controlling diabetes with insulin      New, Established, New Problem: est  Location: Toenails  Duration:  Greater than five years  Onset: Gradual  Nature: sore with palpation.  Stable, worsening, improving: Worsening  Aggravating factors: Pain with shoe gear and ambulation.  Previous Treatment: His wife states that she has trouble in his toenails due to the thickness.    Patient denies any fevers, chills, nausea, vomiting, shortness of breathe, nor any other constitutional signs nor symptoms.          New, Established, New Problem:  new   Location:  bilateral feet  Duration:  10 years  Onset:  gradual  Nature:  IDDM  Stable, worsening, improving:  stable  Aggravating factors:  Previous Treatment:  insulin  Pt states their most recent blood glucose reading was 93.    Patient is on hemodialysis 3 times a week.    No medical changes since his visit.    Reports last B.       Past Medical History  Anemia, Unspecified; Arthritis; Bladder Cancer; Bladder Disorder; Cancer; Congestive heart failure; Diabetes; Dialysis patient; Gout; Hammertoe; Heart Disease; High blood pressure; High cholesterol; Hyperthyroidism;  Ingrown toenail; Kidney Disease; Limb Pain; Limb Swelling; Numbness in feet; Oxygen dependent; Renal Failure; Renal mass         Past Surgical History  Arm Surgery; cardiac stents; Colonoscopy; Cystoscopy with biopsy of bladder; EGD; Heart Bypass; Hemorrhoidectomy; Leg Surgery; Percutaneous cryoablation of kidney; Toe Surgery; TURBT; Vascular Surgery         Medication List  atorvastatin 40 mg oral tablet; famotidine 20 mg oral tablet; febuxostat oral; finasteride 5 mg oral tablet; Fish Oil 360-1,200 mg oral capsule; gabapentin 300 mg oral capsule; iron 325 mg (65 mg iron) oral tablet; Lantus U-100 Insulin 100 unit/mL subcutaneous solution; Lasix 40 mg oral tablet; multivitamin oral tablet; Nitrostat 0.4 mg sublingual tablet, sublingual; Oxygen Dependent; tamsulosin 0.4 mg oral capsule; Toprol XL 25 mg oral tablet extended release 24 hr; Vitamin C 250 mg oral tablet; Vitamin D3 4,000 unit oral capsule         Allergy List  Dust Mite; Molds; naproxen       Allergies Reconciled  Family Medical History  Family history of heart disease         Social History  Alcohol (Never); Second hand smoke exposure (Never); Tobacco (Former)         Review of Systems  · Constitutional  o Denies  o : fatigue, night sweats  · Eyes  o Denies  o : double vision, blurred vision  · HENT  o Denies  o : vertigo, recent head injury  · Cardiovascular  o Denies  o : chest pain, irregular heart beats  · Respiratory  o Denies  o : shortness of breath, productive cough  · Gastrointestinal  o Denies  o : nausea, vomiting  · Genitourinary  o Denies  o : dysuria, urinary retention  · Integument  o * See HPI  · Neurologic  o * See HPI  · Musculoskeletal  o * See HPI  · Endocrine  o Denies  o : cold intolerance, heat intolerance  · Heme-Lymph  o Denies  o : petechiae, lymph node enlargement or tenderness  · Allergic-Immunologic  o Denies  o : frequent illnesses      Vitals  Date Time BP Position Site L\R Cuff Size HR RR TEMP (F) WT  HT  BMI kg/m2 BSA  "m2 O2 Sat        09/03/2020 01:10 /38 Sitting    78 - R  97.3 217lbs 2oz 5'  9\" 32.06 2.19 89 %          Physical Examination  · Constitutional  o Appearance  o : well-nourished, well developed, no obvious deformities present, appears to be chronically ill   · Respiratory  o Respiratory Effort  o : No labored breathing. Good respiratory effort.   · Cardiovascular  o Peripheral Vascular System  o :   § Pedal Pulses  § : bilateral pulse strength absent   § Extremities  § : There is no edema of the lower extremities  · Musculoskeletal  o Extremeties/Joint  o : Lower extremity muscle strength and range of motion is equal and symmetrical bilaterally. The knees are noted to be in normal alignment. Well-healed amputation of the left second toe.  · Skin and Subcutaneous Tissue  o General Inspection  o : Atrophic skin changes seen throughout the lower extremity bilaterally.  · Neurologic  o Sensation  o : Sharp/dull sensation is absent bilaterally. Monofilament sensation examination of the left foot is absent. Monofilament sensation examination of the right foot is absent.   · Left DM Foot Exam  o Sensation  o : Blaine-Anjali 5.07 monofilament absent to all assessed areas. Sharp/dull sensation is absent.   o Visual Inspection  o : Skin is noted to have normal texture and turgor, with no excrescences noted. The toenails are noted to be without disease  o Vascular  o : Delayed capillary refill to toes.  · Right DM Foot Exam  o Sensation  o : Blaine-Anjali 5.07 monofilament absent to all assessed areas. Sharp/dull sensation is absent.   o Visual Inspection  o : Skin is noted to have normal texture and turgor, with no excrescences noted. The toenails are noted to be without disease  o Vascular  o : Delayed capillary refill to toes.  · Toes  o Toes: Right Foot  o :   § Toenails  § : Toenails are hypertrophic, mycotic, dystrophic, brittle toenail(s) at nail 1, 2, 3, 4, 5 with onycholysis of the right foot. The 1st, " 2nd, 3rd, 4th, 5th toenail(s) on the right have 2 mm in thickness with subungual detritus. There is an incurvated toenail at the distal border of the 1st, 2nd, 3rd, 4th, 5th toe  o Toes: Left Foot  o :   § Toenails  § : There are hypertrophic, mycotic, dystrophic, brittle toenail(s) at the 1, 3, 4, 5 with onycholysis of the left foot. The 1st, 3rd, 4th, 5th toenail(s) on the left have 2 mm in thickness with subungual detritus. There is an incurvated toenail at the distal border of the 1st, 3rd, 4th, 5th toe  · Procedures  o Nail Debridement  o : Nail debridement is indicated for the following toenails:, left hallux, left 3rd toe, left 4th toe, left 5th toe, right hallux, right 2nd toe, right 3rd toe, right 4th toe, right 5th toe. The nail was debrided of excessive thickness to appropriate levels of comfort and contour using, nail nippers. The procedure was without complications     Patient uses a cane for assistance with ambulation.  Patient using portable nasal oxygen.           Assessment  · Diabetes mellitus type 2, insulin dependent       Type 2 diabetes mellitus without complications     250.00/E11.9  Long term (current) use of insulin     250.00/Z79.4  · Diabetic neuropathy       Type 2 diabetes mellitus with diabetic neuropathy, unspecified     250.60/E11.40  · Foot pain, bilateral       Pain in right foot     729.5/M79.671  Pain in left foot     729.5/M79.672  · Ingrowing nail     703.0/L60.0  · Tinea unguium     110.1/B35.1  · Peripheral vascular disease     443.9/I73.9  · Dialysis patient     V45.11/Z99.2  · Toe amputee     V49.72/Z89.429  · PVD (peripheral vascular disease)     443.9/I73.9      Plan  · Orders  o Debridement of six or more nails (56017) - - 09/03/2020  o Diabetic Foot (Motor and Sensory) Exam Completed [facility] (, , 2028F) - - 09/03/2020  o VASCULAR SURGERY CONSULTATION ([facility]) - - 09/03/2020  · Medications  o Medications have been Reconciled  o Transition of Care or Provider  Policy  · Instructions  o Follow Up in 9 weeks  o I have discussed the findings of this evaluation with the patient. The discussion included a complete verbal explanation of any changes in the examination results, diagnosis, and the current treatment plan. A schedule for future care needs was explained. If any questions should arise after returning home, I have encouraged the patient to feel free to contact Dr. Carlson. The patient states understanding and agreement with this plan.   o Pt to monitor for problems and to contact Dr. Carlson for follow-up should such signs occur. Patient states understanding and agreement with this plan.   o Onychomycosis is present in 2-3% of the population with the most common source of contamination coming from the patient's own skin. Fifteen to 20 percent of people between the ages of 40 and 60 have onychomycosis, 32 percent of 60- to 44-[id number]-wubd have nail fungus and approximately 50 percent of those over 70 are afflicted. Seventy-five percent of the people who have this infection exhibit psychosocial concerns. These people face the dilemma of not being able to go to the swimming pool, public shower areas or even wear open-toed sandals. More important is the fact that 48 percent of the people with onychomycosis have pain. The pain is intense enough to have these patients miss 1.8 days of work on average over a six-month period. Traditional topical therapies used alone are generally ineffective for clearing the primary infection, and even oral therapy is associated with a high rate of initial treatment failure or recurrence. In many patients combination oral and topical therapy is the treatment of choice.   o I have recommended debridement of the devitalized or contaminated tissue. Debridement will relieve the pressure of the necrotic presence of on the nail and provides for a better cosmetic appearance. Debulking the nail does help in combination with other treatments in that it  can decrease the fungal load of the nail itself.   o Diabetic foot exam performed and documented this date, compliant with CQM required standards. Detail of findings as noted in physical exam. Lower extremity Neurologic exam for diabetic patient performed and documented this date, compliant with PQRS required standards. Detail of findings as noted in physical exam. Advised patient importance of good routine lower extremity hygiene. Advised patient importance of evaluating for intact skin and pain free nail borders. Advised patient to use mirror to evaluate plantar/ soles of feet for better visualization. Advised patient monitor and phone office to be seen if any cracking to skin, open lesions, painful nail borders or if nails become elongated prior to next visit. Advised patient importance of daily cleansing of lower extremities, followed by good skin cream to maintain normal hydration of skin. Also advised patient importance of close daily monitoring of blood sugar. Advised to regulate diet and medications to maintain control of blood sugar in optimal range. Contact primary care provider if difficulties maintaining blood sugar levels. Advised Patient of presence of Diabetes Mellitus condition. Advised Patient risk of progression and worsening or improvement, then return of condition. Will monitor condition for any change in future. Treat with most appropriate treatment pending status of condition. Counseled and advised patient extensively on nature and ramifications of diabetes. Standard instructions given to patient for good diabetic foot care and maintenance. Advised importance of careful monitoring to avoid break down and complications secondary to diabetes. Advised patient importance of strict maintenance of blood sugar control. Advised patient of possible ominous results from neglect of condition, i.e.: amputation/ loss of digits, feet and legs, or even death. Patient states understands counseling, will  monitor closely, continue good hygiene and routine diabetic foot care. Patient will contact office is questions or problems.   o Electronically Identified Patient Education Materials Provided Electronically  · Disposition  o Call or Return if symptoms worsen or persist.            Electronically Signed by: Juanito Carlson DPM -Author on September 3, 2020 01:34:45 PM

## 2021-05-13 NOTE — PROGRESS NOTES
Progress Note      Patient Name: Fidel Cleary   Patient ID: [id number]   Sex: Male   YOB: 1935    Primary Care Provider: Rocco Haskins MD   Referring Provider: Rocco Haskins MD    Visit Date: July 16, 2020    Provider: Jennifer Tolentino MD   Location: [facility]   Location Address: [address] [place] [address]  [id number]   Location Phone: [phone]          History Of Present Illness  The patient is a 85 year old /White male, who presents on referral from Rocco Haskins MD, for a urological evaluation for the history of bladder cancer. The symptoms began in 2004 or so.   He has had a few bladder tumors and has also had bladder BCG as well as TURBT. He last had a cystoscopy in March 2019 and was on yearly cystos recently. The treatment at this point for this stage of bladder cancer has been tumor ablation and BCG treatment.   Since the last viisit, the following diagnostic study has been completed: renal/bladder ultrasound. The renal-bladder ultrasound revealed a left renal mass at 4.6cm, an a small bladder diverticulum or ureterocele. He underwent a cryotherapy for his renal mass in Feb 2020. He has had no issues since then. 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   Patient does have a history of bladder cancer. His smoking status - history of smoking but quit. There is not a history of environmental exposures.        He has had a known left renal mass that his urologist in [place] had been watching as he stated it was growing slowly.  It is now 4.6cm.  He thinks at last imagining it was 3cm.  He had a percutaneous cryoablation of this mass in Feb 2020.        Recent CT scan done in the emergency room for some abdominal pain revealed a new bladder mass.  Did a TURBT on this in Jan 2020 and it was negative.  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       TELEHEALTH TELEPHONE VISIT  Fidel Cleary is a 85 year old /White male who is presenting for  evaluation via telehealth telephone visit. Verbal consent obtained before beginning visit.   Provider spent 5 minutes with the patient during the telehealth visit.   The following staff were present during this visit: Moraima Marmolejo       Past Medical History  Anemia, Unspecified; Arthritis; Bladder Cancer; Bladder Disorder; Cancer; Congestive heart failure; Diabetes; Dialysis patient; Gout; Hammertoe; Heart Disease; High blood pressure; High cholesterol; Hyperthyroidism; Ingrown toenail; Kidney Disease; Limb Pain; Limb Swelling; Numbness in feet; Oxygen dependent; Renal Failure; Renal mass         Past Surgical History  Arm Surgery; cardiac stents; Colonoscopy; Cystoscopy with biopsy of bladder; EGD; Heart Bypass; Hemorrhoidectomy; Leg Surgery; Percutaneous cryoablation of kidney; Toe Surgery; TURBT; Vascular Surgery         Medication List  atorvastatin 40 mg oral tablet; famotidine 20 mg oral tablet; febuxostat oral; finasteride 5 mg oral tablet; Fish Oil 360-1,200 mg oral capsule; gabapentin 300 mg oral capsule; iron 325 mg (65 mg iron) oral tablet; Lantus U-100 Insulin 100 unit/mL subcutaneous solution; Lasix 40 mg oral tablet; multivitamin oral tablet; Nitrostat 0.4 mg sublingual tablet, sublingual; Oxygen Dependent; tamsulosin 0.4 mg oral capsule; Toprol XL 25 mg oral tablet extended release 24 hr; Vitamin C 250 mg oral tablet; Vitamin D3 4,000 unit oral capsule         Allergy List  Dust Mite; Molds; naproxen         Family Medical History  Family history of heart disease         Social History  Alcohol (Never); Second hand smoke exposure (Never); Tobacco (Former)         Review of Systems  · Constitutional  o Denies  o : fatigue, fever  · Gastrointestinal  o Denies  o : nausea, vomiting          Assessment  · Kidney Disease     585.9  · Cancer of overlapping sites of bladder     188.8/C67.8  · Renal mass     593.9/N28.89  · Gross hematuria     599.71/R31.0      Plan  · Orders  o MRI abdomen w/w/o contrast  (28889) - 593.9/N28.89, 585.9 - 01/16/2021  · Instructions  o Cystoscopy in 6 months in office. He will have an MRI prior.             Electronically Signed by: Vikki Bowles-, -Author on July 24, 2020 10:43:13 AM  Electronically Co-signed by: Jennifer Tolentino MD -Reviewer on July 24, 2020 12:19:09 PM

## 2021-05-13 NOTE — PROGRESS NOTES
Progress Note      Patient Name: Fidel Cleary   Patient ID: [id number]   Sex: Male   YOB: 1935    Primary Care Provider: Rocco Haskins MD   Referring Provider: Rocco Haskins MD    Visit Date: July 15, 2020    Provider: Neil Amaya MD   Location: [facility]   Location Address: [address] [place] [address] [facility]  [address]  [place], [place]  [id number]   Location Phone: [phone]          Chief Complaint  · Follow-up visit for congestive heart failure   · Recent abnormal labs      History Of Present Illness  REFERRING CARE PROVIDER: Rocco Haskins MD   Fidel Cleary is an 85-year-old male with coronary artery disease, previous angioplasty, chronic combined heart failure, end-stage renal disease on hemodialysis and diabetes mellitus. The patient was recently elevated by telehealth on May 19, 2020. He was referred back because of recent labs showing an elevated pro-BNP. The patient is currently undergoing dialysis Monday, Wednesday and Friday, and the shortness of breath and other symptoms have significantly improved after he was started on dialysis. He still produces some urine and is still on diuretics. Currently he denies having any chest pain, tightness or pressure. No palpitations or dizziness. He has no symptoms suggestive of orthopnea or PND. He feels very weak and tired, and hemoglobin has been low, and he received blood transfusions since his last visit.   PAST MEDICAL HISTORY: (1) Coronary artery disease, status post angioplasty in 2003 and coronary artery bypass grafting in 2004. Last stress test per patient was in March 2019. (2) Chronic combined heart failure. Left ventricular ejection fraction 50% per echocardiogram on 02/07/2020. (3) End-stage renal disease on hemodialysis, followed by Dr. Dumont. (4) Peripheral artery disease involving the left lower extremity. (5) Hypertension. (6) Hyperlipidemia. (7) Benign prostatic hypertrophy. (8) Hypothyroidism. (9) Diabetes  "mellitus, on insulin. (10) Renal mass.   PSYCHOSOCIAL HISTORY: No history of mood changes or depression. He rarely drinks alcohol. He previously used tobacco but quit.   CURRENT MEDICATIONS: include Metoprolol 1/2 tablet b.i.d.; Lasix 40 mg b.i.d; Atorvastatin 40 mg daily; NTG p.r.n.; Pepcid 20 mg b.i.d; Uloric 40 mg daily; insulin; Levothyroxine 175 mcg daily; krill oil; multivitamin; calcium 1200 mg daily; Tamsulosin 0.4 mg daily; Finasteride 5 mg daily; Gabapentin 300 mg daily; magnesium 250 mg daily. The dosage and frequency of the medications were reviewed with the patient.       Review of Systems  · Cardiovascular  o Denies  o : palpitations (fast, fluttering, or skipping beats), swelling (feet, ankles, hands), shortness of breath while walking or lying flat, chest pain or angina pectoris   · Respiratory  o Denies  o : chronic or frequent cough, asthma or wheezing      Vitals  Date Time BP Position Site L\R Cuff Size HR RR TEMP (F) WT  HT  BMI kg/m2 BSA m2 O2 Sat HC       07/15/2020 04:54 /44 Sitting    70 - R   216lbs 0oz 5'  9\" 31.9 2.18           Physical Examination  · Respiratory  o Auscultation of Lungs  o : Bilateral faint wheezing heard. No crackles.  · Cardiovascular  o Heart  o : S1, S2 is normally heard. No S3. No murmur, rubs, or gallops.  · Gastrointestinal  o Abdominal Examination  o : Soft, nontender, nondistended. No free fluid. Bowel sounds heard in all four quadrants.  · Extremities  o Extremities  o : 1+ pitting pedal edema bilaterally. Distal pulses present.     Labs done on 07/02/2020 showed sodium 135, potassium 4.6, chloride 95, bicarb 24, BUN 30, creatinine 4.35.  Calcium 9.3, bilirubin 0.41.  AST 20, ALT 14.  Albumin 3.6, globulin 3.8.  Pro-BNP is 70,000.  TSH is 0.307.  Hemoglobin is 7.2.               Assessment     ASSESSMENT AND PLAN:    1.  Chronic combined heart failure:  Patient also has end-stage renal disease on hemodialysis.  At this time he       is not significantly " volume overloaded, and he produces very little urine.  The pro-BNP is elevated, which is       not unusual for patients on dialysis, and adjusting the medicines, including diuretics, would not help with        this.  Recommend to continue regularly scheduled dialysis. Patient will continue the Lasix as advised by       nephrology.  From a medical standpoint, he will continue with the beta blockers.  Will discuss with        nephrology whether he will be a candidate for ACE inhibitors now that he is on dialysis.  2.  Coronary artery disease:  Previous angioplasty and bypass grafting, currently no angina.  SPECT stress        study done in 2019 did not show any ischemia.  Continue with statin.  Not on aspirin because of recurrent       anemia and need for blood transfusions.  3.  Follow up in October, as scheduled earlier.    MD INDRA Pineda/sunshine           This note was transcribed by Cat Ma.  sunshine/INDRA  The above service was transcribed by Cat Ma, and I attest to the accuracy of the note.  [email or web address]               Electronically Signed by: Cat Ma-, -Author on July 21, 2020 04:59:58 AM  Electronically Co-signed by: Neil Amaya MD -Reviewer on July 21, 2020 08:11:01 AM

## 2021-05-14 VITALS
SYSTOLIC BLOOD PRESSURE: 106 MMHG | TEMPERATURE: 97.3 F | HEART RATE: 78 BPM | OXYGEN SATURATION: 89 % | DIASTOLIC BLOOD PRESSURE: 38 MMHG | WEIGHT: 217.12 LBS | BODY MASS INDEX: 32.16 KG/M2 | HEIGHT: 69 IN

## 2021-05-15 VITALS
HEART RATE: 78 BPM | HEIGHT: 69 IN | WEIGHT: 228 LBS | OXYGEN SATURATION: 98 % | BODY MASS INDEX: 33.77 KG/M2 | DIASTOLIC BLOOD PRESSURE: 51 MMHG | SYSTOLIC BLOOD PRESSURE: 94 MMHG

## 2021-05-15 VITALS
HEIGHT: 69 IN | BODY MASS INDEX: 31.99 KG/M2 | DIASTOLIC BLOOD PRESSURE: 44 MMHG | HEART RATE: 70 BPM | SYSTOLIC BLOOD PRESSURE: 108 MMHG | WEIGHT: 216 LBS

## 2021-05-15 VITALS
DIASTOLIC BLOOD PRESSURE: 66 MMHG | SYSTOLIC BLOOD PRESSURE: 121 MMHG | HEART RATE: 68 BPM | WEIGHT: 237 LBS | BODY MASS INDEX: 35.1 KG/M2 | HEIGHT: 69 IN

## 2021-05-15 VITALS — RESPIRATION RATE: 14 BRPM | BODY MASS INDEX: 31.5 KG/M2 | HEIGHT: 70 IN | WEIGHT: 220 LBS

## 2021-05-15 VITALS
DIASTOLIC BLOOD PRESSURE: 28 MMHG | SYSTOLIC BLOOD PRESSURE: 100 MMHG | WEIGHT: 236.25 LBS | OXYGEN SATURATION: 89 % | BODY MASS INDEX: 34.99 KG/M2 | HEART RATE: 57 BPM | HEIGHT: 69 IN

## 2021-05-15 VITALS
HEIGHT: 69 IN | WEIGHT: 235.37 LBS | SYSTOLIC BLOOD PRESSURE: 134 MMHG | DIASTOLIC BLOOD PRESSURE: 77 MMHG | HEART RATE: 94 BPM | BODY MASS INDEX: 34.86 KG/M2

## 2021-05-15 VITALS
WEIGHT: 226.12 LBS | SYSTOLIC BLOOD PRESSURE: 118 MMHG | HEIGHT: 69 IN | DIASTOLIC BLOOD PRESSURE: 54 MMHG | BODY MASS INDEX: 33.49 KG/M2

## 2021-05-15 VITALS
BODY MASS INDEX: 32.41 KG/M2 | DIASTOLIC BLOOD PRESSURE: 74 MMHG | SYSTOLIC BLOOD PRESSURE: 142 MMHG | HEIGHT: 70 IN | WEIGHT: 226.37 LBS

## 2021-05-15 VITALS
WEIGHT: 215 LBS | DIASTOLIC BLOOD PRESSURE: 78 MMHG | HEIGHT: 69 IN | TEMPERATURE: 98.9 F | HEART RATE: 54 BPM | OXYGEN SATURATION: 91 % | SYSTOLIC BLOOD PRESSURE: 112 MMHG | BODY MASS INDEX: 31.84 KG/M2

## 2021-05-15 VITALS
HEIGHT: 69 IN | SYSTOLIC BLOOD PRESSURE: 117 MMHG | BODY MASS INDEX: 31.25 KG/M2 | WEIGHT: 211 LBS | DIASTOLIC BLOOD PRESSURE: 57 MMHG

## 2021-05-15 VITALS
SYSTOLIC BLOOD PRESSURE: 122 MMHG | BODY MASS INDEX: 34.44 KG/M2 | HEIGHT: 69 IN | WEIGHT: 232.5 LBS | DIASTOLIC BLOOD PRESSURE: 62 MMHG

## 2021-05-15 VITALS
DIASTOLIC BLOOD PRESSURE: 89 MMHG | HEIGHT: 69 IN | BODY MASS INDEX: 32.14 KG/M2 | WEIGHT: 217 LBS | SYSTOLIC BLOOD PRESSURE: 131 MMHG

## 2021-05-15 VITALS
DIASTOLIC BLOOD PRESSURE: 58 MMHG | SYSTOLIC BLOOD PRESSURE: 128 MMHG | WEIGHT: 239 LBS | BODY MASS INDEX: 35.4 KG/M2 | HEIGHT: 69 IN

## 2021-05-15 VITALS
HEIGHT: 69 IN | DIASTOLIC BLOOD PRESSURE: 64 MMHG | BODY MASS INDEX: 31.1 KG/M2 | SYSTOLIC BLOOD PRESSURE: 118 MMHG | WEIGHT: 210 LBS

## 2021-05-15 VITALS
HEIGHT: 69 IN | SYSTOLIC BLOOD PRESSURE: 123 MMHG | BODY MASS INDEX: 33.47 KG/M2 | WEIGHT: 226 LBS | DIASTOLIC BLOOD PRESSURE: 66 MMHG | HEART RATE: 83 BPM

## 2021-05-18 NOTE — PROGRESS NOTES
Fidel Cleary  1935     Office/Outpatient Visit    Visit Date: Wed, May 6, 2020 03:29 pm    Provider: Rocco Haskins MD (Assistant: Alexia Ching MA)    Location: [facility]        Electronically signed by Rocco Haskins MD on  07/08/2020 08:01:26 AM                             Subjective:        CC: Mr. Cleary is a 84 year old White male.  [facility], not [facility] l[facility]opril         HPI:       Pt is on metoprolol 12.5 mg qd. Lisinopril 10 gm qd, spironolactone 12.5 mg qd, and lasix 40 mg qd were d/c'd during hospitalization.      Pt has combined systolic and diastolic heart failure, with ECHO on 1/24/20 showing EF 25-30%, grade 2 diastolic dysfunction. significant biventricular dysfunction, LVH, severe pulmonary artery regurg. He is currently on metoprolol 12.5 mg qd with lasix d/c'd as there is difficult managing volume status with ESRD and newly started dialysis. Pt admits to shortness of breath walking a short distance and even with transferring from one chair to another or to bed. He has BLE edema He is prescribed 5L of O2 by [facility] continuously but he only wears 2L during the day and 5L at night. Pt saw Dr. Amaya on 1/15/20.      Pt has worsening CKD as well as worsening heart failure that was making fluid balance very difficult. Pt ended up having a tunnelled dialysis catheter placed in right upper chest on in early April and he has been having dialysis MWF at [facility] here in [place]. He is still making urine, although stream is weaker than before. He is unsure if he still needs lasix 80 mg BID. He has some swelling but no worsening shortness of breath and overall he doesn't feel worse without it. He has been out of lasix for 1 week but he has been getting dialysis.    ROS:     CONSTITUTIONAL:  Positive for fatigue ( severe ).   Negative for fever.      EYES:  Negative for blurred vision.      E/N/T:  Negative for diminished hearing and nasal congestion.       CARDIOVASCULAR:  Negative for chest pain, orthopnea ( resolved with lasix ) and palpitations.      RESPIRATORY:  Positive for dyspnea ( with mild exertion ).   Negative for recent cough.      GASTROINTESTINAL:  Positive for melena ( he is on iron tabs ).   Negative for abdominal pain, constipation, diarrhea, hematochezia, nausea or vomiting.      GENITOURINARY:  Negative for dysuria, hematuria and urinary retention.      MUSCULOSKELETAL:  Positive for arthralgias and (diffuse) back pain.   Negative for myalgias.      NEUROLOGICAL:  Negative for paresthesias and weakness.      PSYCHIATRIC:  Negative for anxiety, depression and sleep disturbance.          Past Medical History / Family History / Social History:         Last Reviewed on 2020 04:38 PM by Rocco Haskins    Past Medical History:             PAST MEDICAL HISTORY         Congestive Heart Failure: dx'd in ;     Coronary Artery Disease: dx'd in ;     Chronic Renal Failure: dx'd in ;     diabetic ucler left toe 17         Surgical History:         Cataract Removal: ;     left toe amputated 3-18-18;     Other Surgeries:    lipoma right arm 0545-1919;    CABG 2004;    penile implant 2006;    Bladder cancer surgery ;     Procedures: cardiac stents 2003 vascular surgery left leg 2018 and Candi          Family History:     Father:  at age 78; Cause of death was heart related     Mother:  at age 89     Brother(s): 4 brother(s) total; 4 ;  Arrhythmia;  COPD;  Parkinson's Disease;  MVA age 19     Sister(s): 1 sister(s) total; 1 ;  heart related     Son(s): 1 son(s) total; 1 ;  Leukemia (  age 38 )     Daughter(s): Healthy; 1 daughter(s) total         Social History:     Occupation: Retired (Prior occupation: [employer])     Marital Status: / /remarried     Children: 2 children         Tobacco/Alcohol/Supplements:     Last Reviewed on 2020  04:38 PM by Rocco Haskins    Tobacco: He has a past history of cigarette smoking; quit date:  1990.          Substance Abuse History:     Last Reviewed on 4/18/2020 04:38 PM by Rocco Haskins        Mental Health History:     Last Reviewed on 4/18/2020 04:38 PM by Rocco Haskins        Communicable Diseases (eg STDs):     Last Reviewed on 4/18/2020 04:38 PM by Rocco Haskins        Current Problems:     Last Reviewed on 4/18/2020 04:38 PM by Rocco Haskins    Hypothyroidism, unspecified    Type 2 diabetes mellitus without complications    Other iron deficiency anemias    Atherosclerotic heart disease of native coronary artery without angina pectoris    Peripheral vascular disease, unspecified    Inflammatory polyarthropathy    Gout, unspecified    Heartburn    Chronic combined systolic (congestive) and diastolic (congestive) heart failure    Hyperlipidemia, unspecified    Low back pain    Chronic kidney disease, stage 3 (moderate)    Neoplasm of uncertain behavior of left kidney    Anemia, unspecified    Obstructive sleep apnea (adult) (pediatric)    Essential (primary) hypertension    Encounter for immunization    Encounter for follow-up examination after completed treatment for conditions other than malignant neoplasm    Urinary tract infection, site not specified    Prediabetes    Gross hematuria        Immunizations:     Influenza, high dose seasonal 12/26/2019    pneumococcal polysaccharide PPV23 (PNEUMOVAX 23) 11/13/2019    Fluzone High-Dose pf (>=65 yr) 11/1/2018        Allergies:     Last Reviewed on 4/18/2020 04:38 PM by Rocco Haskins    Naprosyn:      mold:      Dust mites:          Current Medications:     Last Reviewed on 4/18/2020 04:38 PM by Rocco Haskins    docusate sodium 100 mg oral capsule [take 1 capsule (100 mg) by oral route 2 times per day]    insulin lispro 100 unit/mL subcutaneous Solution [inject by subcutaneous route per prescriber's instructions. Insulin dosing requires  "individualization.]    polyethylene glycoL 3350 17 gram oral Powder in Packet [take 1 packet (17 gram) mixed with 8 oz. water, juice, soda, coffee or tea by oral route twice daily]    Finasteride 5 mg oral tablet [Take 1 tablet(s) by mouth daily for prostate]    levothyroxine 175 mcg oral tablet [TAKE 1 TABLET DAILY]    atorvastatin 40 mg oral tablet [TAKE 1 TABLET DAILY]    Bone D3 Immune 4000mg tablet po daily @ 12n     Multivitamin tablet po daily      Nitrostat 0.4 mg Sublingual Tablet, Sublingual [as directed]    gabapentin 300 mg oral capsule [1 tablet po qhs]    tamsulosin 0.4 mg oral capsule [take 1 capsule (0.4 mg) by oral route once daily 1/2 hour following the same meal each day]    metoprolol succinate 25 mg oral Tablet, Extended Release 24 hr [Take 1/2 tablet daily]    Uloric 40 mg oral tablet [TAKE 1 TABLET DAILY]    Vitamin C 250 mg oral tablet [1 tab TID with iron supplement]    Blood Glucose Test strips  [Check blood sugars twice daily. Dispense brand covered by insurance. E11.9]    lisinopril 10 mg oral tablet [take 1 tablet (10 mg) by oral route once daily]    Pen Needle 31 gauge x 1/4\"  [use as directed ]    famotidine 20 mg oral tablet [take 1 tablet (20 mg) by oral route 1 times per day]        Objective:        Exams:     PHYSICAL EXAM:     GENERAL: well developed, well nourished,  mildly obese;  well groomed;  no apparent distress, tired-appearing;     EYES: extraocular movements intact;     NECK: range of motion is normal;     RESPIRATORY: mildly tachypneic; use of accessory muscles with respiration;     CARDIOVASCULAR: no cyanosis;     MUSCULOSKELETAL: gait: uses a cane;     NEUROLOGIC: mental status: alert and oriented x 3; GROSSLY INTACT     PSYCHIATRIC:  appropriate affect and demeanor; normal speech pattern; grossly normal memory;         Assessment:         I10   Essential (primary) hypertension       I50.42   Chronic combined systolic (congestive) and diastolic (congestive) heart " failure       N18.6   End stage renal disease           ORDERS:         Meds Prescribed:       [New Rx] furosemide 80 mg oral tablet [take 1 tablet (80 mg) by oral route 2 times per day], #60 (sixty) tablets, Refills: 0 (zero)         Lab Orders:       FUTURE  Future order to be done at patients convenience  (Send-Out)            77410  [facility] CBC with 3 part diff  (Send-Out)            09394  COMP [facility] Comp. Metabolic Panel  (Send-Out)            96706  [facility] TSH  (Send-Out)              Procedures Ordered:       REFER  Referral to Specialist or Other Facility  (Send-Out)                      Plan:         Essential (primary) hypertensionUnclear how well BP is controlled at telehealth visit but metoprolol 12.5 mg qd seems reasonable given hypotension during admission and systolic heart failure.     Telehealth: Verbal consent obtained for visit to occur via televideo conferencing; Staff, other than provider, present during telephone visit include Alexia Ching     FOLLOW-UP TESTING #1: FOLLOW-UP LABORATORY:  Labs to be scheduled in the future include CBC, CMP, and TSH.            Orders:       FUTURE  Future order to be done at patients convenience  (Send-Out)            [id number]  [facility] CBC with 3 part diff  (Send-Out)            73287  [facility] Comp. Metabolic Panel  (Send-Out)            85990  TSH [facility] TSH  (Send-Out)              Chronic combined systolic (congestive) and diastolic (congestive) heart failureSystolic and diastolic heart failure is stable but reaching end stage status as he requires supplemental O2, has a very low EF, and had a cardiac arrest during recent hospitalization. Pt referred to Dr. Amaya to help facilitate quick follow up.         REFERRALS:  Referral initiated to a cardiologist ( Dr. Neil Amaya, [facility] ).            Orders:       REFER  Referral to Specialist or Other Facility  (Send-Out)              End stage renal diseaseLasix 80 mg BID  is re-started as pt is still making urine and appears volume overloaded with edema and shortness of breath. Pt advised to discuss lasix with cardiology and nephrology.           Prescriptions:       [New Rx] furosemide 80 mg oral tablet [take 1 tablet (80 mg) by oral route 2 times per day], #60 (sixty) tablets, Refills: 0 (zero)             Patient Recommendations:        For  Essential (primary) hypertension:            The following laboratory testing has been ordered: CBC metabolic panel, comprehensive TSH             Charge Capture:         Primary Diagnosis:     I10  Essential (primary) hypertension           Orders:      45493  Office/outpatient visit; established patient, level 4  (In-House)              I50.42  Chronic combined systolic (congestive) and diastolic (congestive) heart failure     N18.6  End stage renal disease

## 2021-05-18 NOTE — PROGRESS NOTES
"Fidel Cleary  1935     Office/Outpatient Visit    Visit Date: Fri, Jan 17, 2020 10:58 am    Provider: Rocco Haskins MD (Assistant: Jessica Saenz, )    Location: [facility]        Electronically signed by Rocco Haskins MD on  01/17/2020 06:42:44 PM                             Subjective:        CC: Mr. Cleary is a 84 year old White male.  This is a follow-up visit.  outpatient sx on 1/10/20, \"scraped bladder\"         HPI:       Patient had a follow up with Dr. Tolentino on 1/15 and they are planning a cryosurgery on left renal mass, maybe 2/11. This is dependent on getting pulmonology and urology coordinated.      BP today 153/92, HR is 92. He is on lisinopril 10 gm qd, metoprolol 12.5 mg qd as well as lasix 40 mg qd and Spironolactone 25 mg 1/2 tab daily as started by  yesterday. He checks a couple times a week and its typically 118/60s.      A1c has been 5.8 on 7/17/19 and 5.2 on 10/15/19. He is on lantus 4 units BID.      Iron level is declining in setting of renal mass and despite pt taking ferrous sulfate TID with vitamin C. Pt had a reaction to iron infusions.     ROS:     CONSTITUTIONAL:  Positive for fatigue ( severe ).   Negative for fever.      EYES:  Negative for blurred vision.      E/N/T:  Negative for diminished hearing and nasal congestion.      CARDIOVASCULAR:  Negative for chest pain, orthopnea ( resolved with lasix ) and palpitations.      RESPIRATORY:  Positive for dyspnea ( with moderate exertion; with mild exertion ).   Negative for recent cough.      GASTROINTESTINAL:  Positive for heartburn ( better with zantac ) and melena ( he is on iron tabs ).   Negative for abdominal pain, constipation, diarrhea, hematochezia, nausea or vomiting.      GENITOURINARY:  Positive for hematuria.   Negative for dysuria.      MUSCULOSKELETAL:  Positive for arthralgias and (diffuse) back pain.   Negative for myalgias.      NEUROLOGICAL:  Negative for paresthesias and " weakness.      PSYCHIATRIC:  Negative for anxiety, depression and sleep disturbance.          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         Surgical History:         Cataract Removal: ;     left toe amputated 3-18-18;     Other Surgeries:    lipoma right arm 7999-1515;    CABG 2004;    penile implant 2006;    Bladder cancer surgery ;     Procedures: cardiac stents 2003 vascular surgery left leg 2018 and Candi          Family History:     Father:  at age 78; Cause of death was heart related     Mother:  at age 89     Brother(s): 4 brother(s) total; 4 ;  Arrhythmia;  COPD;  Parkinson's Disease;  MVA age 19     Sister(s): 1 sister(s) total; 1 ;  heart related     Son(s): 1 son(s) total; 1 ;  Leukemia (  age 38 )     Daughter(s): Healthy; 1 daughter(s) total         Social History:     Occupation: Retired (Prior occupation: [employer])     Marital Status: / /remarried     Children: 2 children         Tobacco/Alcohol/Supplements:     Last Reviewed on 2020 06:41 PM by Rocco Haskins    Tobacco: He has a past history of cigarette smoking; quit date:  .          Substance Abuse History:     Last Reviewed on 2020 06:41 PM by Rocco Haskins        Mental Health History:     Last Reviewed on 2020 06:41 PM by Rocco Haskins        Communicable Diseases (eg STDs):     Last Reviewed on 2020 06:41 PM by Rocco Haskins        Current Problems:     Last Reviewed on 2020 06:41 PM by Rocco Haskins    Hypothyroidism, unspecified    Type 2 diabetes mellitus without complications    Other iron  deficiency anemias    Atherosclerotic heart disease of native coronary artery without angina pectoris    Peripheral vascular disease, unspecified    Inflammatory polyarthropathy    Gout, unspecified    Heartburn    Chronic combined systolic (congestive) and diastolic (congestive) heart failure    Hyperlipidemia, unspecified    Low back pain    Chronic kidney disease, stage 3 (moderate)    Neoplasm of uncertain behavior of left kidney    Anemia, unspecified    Obstructive sleep apnea (adult) (pediatric)    Essential (primary) hypertension    Encounter for immunization    Encounter for follow-up examination after completed treatment for conditions other than malignant neoplasm    Urinary tract infection, site not specified    Prediabetes        Immunizations:     Influenza, high dose seasonal 12/26/2019    pneumococcal polysaccharide PPV23 (PNEUMOVAX 23) 11/13/2019    Fluzone High-Dose pf (>=65 yr) 11/1/2018        Allergies:     Last Reviewed on 1/17/2020 06:41 PM by Rocco Haskins    Naprosyn:      mold:      Dust mites:          Current Medications:     Last Reviewed on 1/17/2020 06:41 PM by Rocco Haskins    metoprolol succinate 25 mg oral Tablet, Extended Release 24 hr [Take 1/2 tablet daily]    Bone D3 Immune 4000mg tablet po daily @ 12n     Multivitamin tablet po daily      Fish Oil      tamsulosin 0.4 mg oral capsule [1 tab daily]    Nitrostat 0.4 mg Sublingual Tablet, Sublingual [as directed]    Finasteride 5 mg oral tablet [Take 1 tablet(s) by mouth daily for prostate]    Synthroid 0.175mg Tablet [Take 1 tablet(s) by mouth daily]    levothyroxine 175 mcg oral tablet [TAKE 1 TABLET DAILY]    furosemide 40 mg oral tablet [1 tablet po tid ]    atorvastatin 40 mg oral tablet [1 tab daily]    gabapentin 300 mg oral capsule [1 tablet po qhs]    Lantus U-100 Insulin 100 unit/mL subcutaneous Solution [4 units BID]    Uloric 40 mg oral tablet [TAKE 1 TABLET DAILY]    Vitamin C 250 mg oral tablet [1 tab TID with  "iron supplement]    MagOx 400 mg (241.3 mg magnesium) oral tablet [1 po qd]    Blood Glucose Test strips  [Check blood sugars twice daily. Dispense brand covered by insurance. E11.9]    Accu-Chek Multiclix Lancets  Lancet [Check blood sugar BID, Dx: E11.9]    lisinopril 10 mg oral tablet [take 1 tablet (10 mg) by oral route once daily]    Pen Needle 31 gauge x 1/4\" [use as directed ]    SPIRONOLACTONE 25 MG TABLET        Objective:        Vitals:         Current: 1/17/2020 11:07:54 AM    Ht:  5 ft, 10 in;  Wt: 228.6 lbs;  BMI: 32.8T: 98.1 F (oral);  BP: 153/92 mm Hg (right arm, sitting);  P: 92 bpm (right arm (BP Cuff), sitting);  sCr: 1.73 mg/dL;  GFR: 35.14        Exams:     PHYSICAL EXAM:     GENERAL: Vitals recorded well developed, well nourished,  mildly obese;  well groomed;  no apparent distress;     EYES: extraocular movements intact; conjunctiva and cornea are normal; PERRLA;     E/N/T: OROPHARYNX:  normal mucosa, dentition, gingiva, and posterior pharynx;     NECK: range of motion is normal; trachea is midline; thyroid is non-palpable;     RESPIRATORY: normal respiratory rate and pattern with no distress; rales (\"crackles\") present in the bases;     CARDIOVASCULAR: mildly tachycardic;  rhythm is regular;  no systolic murmur;     GASTROINTESTINAL: nontender; slightly distended; normal bowel sounds;     MUSCULOSKELETAL: gait: uses a walker;  normal overall tone     NEUROLOGIC: mental status: alert and oriented x 3; GROSSLY INTACT     PSYCHIATRIC:  appropriate affect and demeanor; normal speech pattern; grossly normal memory;         Assessment:         D41.02   Neoplasm of uncertain behavior of left kidney       I10   Essential (primary) hypertension       R73.03   Prediabetes       D50.8   Other iron deficiency anemias           ORDERS:         Lab Orders:       77241  [facility] CBC with 3 part diff  (Send-Out)            71344  COMP - [facility] Comp. Metabolic Panel  (Send-Out)            04795  TSH - [facility] TSH  " (Send-Out)            19725  A1CEG - [facility] Hemoglobin A1C  (Send-Out)            40408  FERR - [facility] Ferritin Serum  (Send-Out)            68069  IRONP - [facility] Iron and TIBC  (Send-Out)                      Plan:         Neoplasm of uncertain behavior of left kidneyHopefully left renal mass will be addressed soon, f/u with urology.         Essential (primary) hypertensionBP is elevated but pt has not yet started the spironolactone prescribed by Dr. Amaya. For now, cont lisinopril 10 gm qd, metoprolol 12.5 mg qd, and lasix 40 mg qd. Consider increasing lisinopril from 10 to 20 mg qd if BP elevated at next visit.     LABORATORY:  Labs ordered to be performed today include CBC, Comprehensive metabolic panel, and TSH.            Orders:       10124  BDCBC - [facility] CBC with 3 part diff  (Send-Out)            66277  COMP - [facility] Comp. Metabolic Panel  (Send-Out)            35522  TSH - [facility] TSH  (Send-Out)              PrediabetesWill recheck A1c today.     LABORATORY:  Labs ordered to be performed today include HgbA1C.            Orders:       05968  A1CEG - [facility] Hemoglobin A1C  (Send-Out)              Other iron deficiency anemiasWill recheck iron level, cont ferrous sulfate and vitamin C TID.     LABORATORY:  Labs ordered to be performed today include Anemia profile ferritin Serum iron.            Orders:       08421  FERR - [facility] Ferritin Serum  (Send-Out)            96795  IRONP - [facility] Iron and TIBC  (Send-Out)                  Charge Capture:         Primary Diagnosis:     D41.02  Neoplasm of uncertain behavior of left kidney           Orders:      67085  Office/outpatient visit; established patient, level 4  (In-House)              I10  Essential (primary) hypertension     R73.03  Prediabetes     D50.8  Other iron deficiency anemias

## 2021-05-18 NOTE — PROGRESS NOTES
Fidel Cleary 1935     Office/Outpatient Visit    Visit Date: Tue, Jul 2, 2019 01:30 pm    Provider: Angela Cleary N.P. (Assistant: Shadia Argueta MA)    Location: [facility]        Electronically signed by Angela Cleary N.P. on  07/02/2019 10:06:15 PM                             SUBJECTIVE:        CC:     Mr. Cleary is a 84 year old White male.  This is his first visit to the clinic.  establishment May 2019 had labs         HPI:         Patient presents with type 2 diabetes.  Current meds include insulin/injectable ( Lantus 25 units BID ).  He reports home blood glucose readings have averaged fasting readings in the <100 mg/dL range.  Has p neuropathy and takes gabapentin for  his DM.         Acquired hypothyroidism, other specified cause details; he is currently taking Levothyroid, 175 mcg daily.  TSH was last checked two months ago.          Dx with other specified iron deficiency anemia; prior workup? The patient's last EGD was . The findings at the last EGD include ulcer in stomach. The patient's last colonoscopy was  had 3 iron infusions before moving to [place], 4-2019, last one and he is on iron orally     PVD     previous vascular surgery, left second toe amputated         Additionally, he presents with history of congestive heart failure.  currently, his treatment regimen consists of a diuretic ( furosemide ) and metoprolol.          PHQ-9 Depression Screening: Completed form scanned and in chart; Total Score 5 Alcohol Consumption Screening: Completed form scanned and in chart; Total Score 0     ROS:     CONSTITUTIONAL:  Negative for chills, fatigue, fever, and weight change.      CARDIOVASCULAR:  Negative for chest pain, palpitations, tachycardia, orthopnea, and edema.      RESPIRATORY:  Negative for cough, dyspnea, and hemoptysis.      NEUROLOGICAL:  Negative for dizziness, headaches, paresthesias, and weakness.          PMH/FMH/SH:     Last Reviewed on  2019 02:25 PM by Angela Cleary    Past Medical History:             PAST MEDICAL HISTORY             Family History:     Father:  at age 78; Cause of death was heart related     Mother:  at age 89     Brother(s): 4 brother(s) total; 4 ;  Arrhythmia;  COPD;  Parkinson's Disease;  MVA age 19     Sister(s): 1 sister(s) total; 1 ;  heart related     Son(s): 1 son(s) total; 1 ;  Leukemia (  age 38 )     Daughter(s): Healthy; 1 daughter(s) total         Social History:         Marital Status: / /remarried     Children: 2 children         Tobacco/Alcohol/Supplements:     Last Reviewed on 2019 01:49 PM by Shadia Argueta    Tobacco: He has a past history of cigarette smoking; quit date:  .              Immunizations:     None        Allergies:     Last Reviewed on 2019 09:51 PM by Angela Cleary    Naprosyn:    mold:    Dust mites:        Current Medications:     Last Reviewed on 2019 01:48 PM by Shadia Argueta    Finasteride 5mg Tablet Take 1 tablet(s) by mouth daily for prostate     Synthroid 0.175mg Tablet Take 1 tablet(s) by mouth daily     Aspirin (ASA) 81mg Tablets, Enteric Coated 1 tab daily     Atorvastatin Calcium 40mg Tablet 1 tab daily     B12 Energy 1000mcg daily     Bone D3 Immune 4000mg tablet po daily     Feosol 350mg tablet po every morning     Fish Oil     Furosemide 40mg Tablets 1 tablet po tid     Gabapentin 300mg Capsules 1 tablet po qhs     Hydroxyzine HCl 25mg Tablet TID     Lantus 100units/1ml Injection Inject 50 units a.m. and p.m.     Metoprolol 50mg Tablet 1 tab bid     Midrin - PRN     Multivitamin tablet po daily     Nitrostat 0.4mg Tablets, Sublingual as directed     Omeprazole 20mg Tablets, Delayed Release 2 tablet po every morning     Pantoprazole 40mg Tablets, Delayed Release Take 1 tablet(s) by mouth bid     Ranitidine 150mg Tablet 1 tab bid     Tamsulosin HCl 0.4mg Capsules 1 tab daily     Vitamin C  1000mg tablet po daily         OBJECTIVE:        Vitals:         Current: 7/2/2019 1:40:34 PM    Ht:  5 ft, 10 in;  Wt: 225.6 lbs;  BMI: 32.4    T: 97.7 F;  BP: 124/48 mm Hg (left arm, sitting);  P: 64 bpm (left arm (BP Cuff), sitting)        Exams:     PHYSICAL EXAM:     GENERAL: Vitals recorded well developed, well nourished, pale;  well groomed;  no apparent distress;     NECK: carotid exam reveals no bruits;     RESPIRATORY: normal respiratory rate and pattern with no distress; normal breath sounds with no rales, rhonchi, wheezes or rubs;     CARDIOVASCULAR: normal rate; rhythm is regular;  no systolic murmur; no edema;     PSYCHIATRIC:  appropriate affect and demeanor; normal speech pattern; grossly normal memory;         ASSESSMENT           250.00   E11.9  Type 2 diabetes              DDx:     244.8   E03.9  Acquired hypothyroidism, other specified cause              DDx:     280.8   D50.8  Other specified iron deficiency anemia              DDx:     443.9   I73.9  Unspecified PVD              DDx:     428.0   I50.9  Congestive heart failure              DDx:     V79.0   Z13.31  Screening for depression              DDx:     414.9   I25.10  Coronary artery disease              DDx:         ORDERS:         Lab Orders:       [id number]  [facility] CBC with 3 part diff  (Send-Out)           Other Orders:         Depression screen negative  (In-House)           Negative EtOH screen  (In-House)                   PLAN:          Type 2 diabetes send for all records from [place], to bring in a copy of his most recent labs; it sounds like he may need to lower his insulin dose, he needs to see MD   /he says though that he is a [employer]  and plans on utilizing their services and would just like to have a local PCP to see if needed         FOLLOW-UP: with MD here          Acquired hypothyroidism, other specified cause check on his last TSH          Other specified iron deficiency anemia repeat a CBC today,  get a copy of last CBC / will need to see hematology at [facility]     LABORATORY:  Labs ordered to be performed today include CBC.            Orders:       [id number]  [facility] CBC with 3 part diff  (Send-Out)            Unspecified PVD will need to be followed by vascular at [facility]          Congestive heart failure will need to see cardiology at [facility]          Screening for depression     MIPS PHQ-9 Depression Screening: Completed form scanned and in chart; Total Score 5; Negative Depression Screen Negative alcohol screen           Orders:         Depression screen negative  (In-House)           Negative EtOH screen  (In-House)            Coronary artery disease to see cardiology at [facility]             CHARGE CAPTURE           **Please note: ICD descriptions below are intended for billing purposes only and may not represent clinical diagnoses**        Primary Diagnosis:         250.00 Type 2 diabetes            E11.9    Type 2 diabetes mellitus without complications              Orders:          51123   Office visit - new pt, level 3  (In-House)           244.8 Acquired hypothyroidism, other specified cause            E03.9    Hypothyroidism, unspecified    280.8 Other specified iron deficiency anemia            D50.8    Other iron deficiency anemias    443.9 Unspecified PVD            I73.9    Peripheral vascular disease, unspecified    428.0 Congestive heart failure            I50.9    Heart failure, unspecified    V79.0 Screening for depression            Z13.31    Encounter for screening for depression              Orders:             Depression screen negative  (In-House)                Negative EtOH screen  (In-House)           414.9 Coronary artery disease            I25.10    Atherosclerotic heart disease of native coronary artery without angina pectoris

## 2021-05-18 NOTE — PROGRESS NOTES
Fidel Cleary 1935     Office/Outpatient Visit    Visit Date: Tue, Jul 16, 2019 11:38 am    Provider: Rocco Haskins MD (Assistant: Sarah Spurling, MA)    Location: [facility]        Electronically signed by Rocco Haskins MD on  07/16/2019 07:20:11 PM                             SUBJECTIVE:        CC:     Mr. Cleary is a 84 year old White male.  This is a follow-up visit.  Pt is needing medications re ordered. & some other issues to talk about. He wants Dr. Haskins as his PCP.          HPI:     Unsure when most recent A1c was but records show A1c of 6.2 and 6.7 in 2018. He is on Lantus 25 units BID and no other medications. He reports home blood glucose readings have averaged fasting readings in the <100. This past weekend pt went out to eat and had steak, sweet potato with brown sugar and bread. ([employer] in [facility]). Usually his diet is much better. Has peripheral neuropathy and takes gabapentin.     Pt has iron deficiency for the last 1.5 to 2 years. Etiology may be bleeding ulcer that was seen on endoscopy October 2018 during hospitalization for GI bleed, CHF exacerbation, and STARLA. Pt had colonoscopy at that time as well and 3 iron infusions between December 2018 to February 2019. Now he is on  feosol 350 mg 2 tab qAM.     Pt is on atorvastatin 40 mg qd for HLD.     Pt has h/o gout flares in right big toe, left big toe, left ankle, right knee, in both hands, and left shoulder.     Pain in right ankle, hands stiffness and swelling in the moring as well as hip pain and stiffness.     Pt is on ranitidine and omeprazol for GERD.     Chart review shows patient has combined systolic and diastolic heart failure. He was hospitalized 9 months ago for CHF exacerbation, STARLA, and GI bleed.     ROS:     CONSTITUTIONAL:  Positive for fatigue.   Negative for fever.      EYES:  Negative for blurred vision.      E/N/T:  Negative for diminished hearing and nasal congestion.      CARDIOVASCULAR:   Negative for chest pain and palpitations.      RESPIRATORY:  Positive for dyspnea ( with moderate exertion ).   Negative for recent cough.      GASTROINTESTINAL:  Positive for heartburn ( better with zantac ).   Negative for abdominal pain, constipation, diarrhea, nausea or vomiting.      GENITOURINARY:  Negative for dysuria.      MUSCULOSKELETAL:  Positive for arthralgias (diffuse).   Negative for myalgias.      INTEGUMENTARY:  Negative for atypical mole(s) and rash.      NEUROLOGICAL:  Negative for paresthesias and weakness.      PSYCHIATRIC:  Negative for anxiety, depression and sleep disturbance.          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         Surgical History:         Cataract Removal: ;      left toe amputated 3-18-18;     Other Surgeries:    lipoma right arm [id number];    CABG 2004;    penile implant 2006;    Bladder cancer surgery ;     Procedures: cardiac stents 2003 vascular surgery left leg 2018 and Candi          Family History:     Father:  at age 78; Cause of death was heart related     Mother:  at age 89     Brother(s): 4 brother(s) total; 4 ;  Arrhythmia;  COPD;  Parkinson's Disease;  MVA age 19     Sister(s): 1 sister(s) total; 1 ;  heart related     Son(s): 1 son(s) total; 1 ;  Leukemia (  age 38 )     Daughter(s): Healthy; 1 daughter(s) total         Social History:     Occupation: Retired (Prior occupation: [employer] )     Marital Status: / /remarried     Children: 2 children         Tobacco/Alcohol/Supplements:     Last Reviewed on 2019 07:17 PM by Rocco Haskins    Tobacco: He has a past history of cigarette smoking; quit date:   1990.          Substance Abuse History:     Last Reviewed on 7/16/2019 07:17 PM by Rocco Haskins        Mental Health History:     Last Reviewed on 7/16/2019 07:17 PM by Rocco Haskins        Communicable Diseases (eg STDs):     Last Reviewed on 7/16/2019 07:17 PM by Rocco Haskins            Current Problems:     Last Reviewed on 7/16/2019 07:17 PM by Rocco Haskins    Combined systolic and diastolic heart failure, chronic     GERD     Unspecified inflammatory polyarthritis     Gout, unspecified     Hyperlipidemia     Coronary artery disease     Congestive heart failure     Unspecified PVD     Acquired hypothyroidism, other specified cause     Type 2 diabetes     Other specified iron deficiency anemia         Immunizations:     Fluzone High-Dose pf (>=65 yr) 11/1/2018         Allergies:     Last Reviewed on 7/16/2019 07:17 PM by Rocco Haskins    Naprosyn:    mold:    Dust mites:        Current Medications:     Last Reviewed on 7/16/2019 07:17 PM by Rocco Haskins    Finasteride 5mg Tablet Take 1 tablet(s) by mouth daily for prostate     Synthroid 0.175mg Tablet Take 1 tablet(s) by mouth daily     Aspirin (ASA) 81mg Tablets, Enteric Coated 1 tab daily     Atorvastatin Calcium 40mg Tablet 1 tab daily     B12 Energy 1000mcg daily     Bone D3 Immune 4000mg tablet po daily     Feosol 350mg tablet po every morning     Fish Oil     Furosemide 40mg Tablets 1 tablet po tid     Gabapentin 300mg Capsules 1 tablet po qhs     Hydroxyzine HCl 25mg Tablet TID     Lantus 100units/1ml Injection Inject 50 units a.m. and p.m.     Metoprolol 50mg Tablet 1 tab bid     Midrin - PRN     Multivitamin tablet po daily     Nitrostat 0.4mg Tablets, Sublingual as directed     Pantoprazole 40mg Tablets, Delayed Release Take 1 tablet(s) by mouth bid     Ranitidine 150mg Tablet 1 tab bid     Tamsulosin HCl 0.4mg Capsules 1 tab daily     Vitamin C 1000mg tablet po daily         OBJECTIVE:        Vitals:         Current: 7/16/2019  11:48:49 AM    Ht:  5 ft, 10 in;  Wt: 226.8 lbs;  BMI: 32.5    T: 97.9 F;  BP: 127/47 mm Hg (right arm, sitting);  P: 67 bpm (right arm (BP Cuff), sitting)        Exams:     PHYSICAL EXAM:     GENERAL: Vitals recorded well developed, well nourished,  mildly obese;  well groomed;  no apparent distress;     EYES: extraocular movements intact; conjunctiva and cornea are normal; PERRLA;     E/N/T: OROPHARYNX:  normal mucosa, dentition, gingiva, and posterior pharynx;     NECK: range of motion is normal; thyroid exam reveals not enlarged;     RESPIRATORY: normal respiratory rate and pattern with no distress; normal breath sounds with no rales, rhonchi, wheezes or rubs;     CARDIOVASCULAR: normal rate; rhythm is regular;  no systolic murmur; no edema;     GASTROINTESTINAL: nontender; normal bowel sounds;     LYMPHATIC: no enlargement of cervical or facial nodes;     MUSCULOSKELETAL: normal gait; normal overall tone     NEUROLOGIC: mental status: alert and oriented x 3; reflexes: knee jerks: 2+;     PSYCHIATRIC:  appropriate affect and demeanor; normal speech pattern; grossly normal memory;         ASSESSMENT           250.00   E11.9  Type 2 diabetes              DDx:     280.8   D50.8  Other specified iron deficiency anemia              DDx:     272.4   E78.5  Hyperlipidemia              DDx:     274.9   M10.9  Gout, unspecified              DDx:     714.9   M06.4  Unspecified inflammatory polyarthritis              DDx:     530.81   R12  GERD              DDx:     428.42   I50.42  Combined systolic and diastolic heart failure, chronic              DDx:         ORDERS:         Meds Prescribed:       Ferrous Sulfate 325mg Tablets 1 tab bid with food  #60 (Sixty) tablet(s) Refills: 2       Vitamin C (Ascorbic Acid Vitamin C) 250mg Tablet 1 tab BID with iron supplement  #60 (Sixty) tablet(s) Refills: 2       Refill of: Atorvastatin Calcium 40mg Tablet 1 tab daily  #90 (Ninety) tablet(s) Refills: 2       Refill of: Ranitidine  150mg Tablet 1 tab bid  #180 (One [place] and [address]y) tablet(s) Refills: 1       Uloric (Febuxostat) 40mg Tablet Take 1 tablet(s) by mouth daily  #30 (Thirty) tablet(s) Refills: 1         Lab Orders:       FUTURE  Future order to be done at patients convenience  (Send-Out)         13243  BDCB - [facility] CBC with 3 part diff  (Send-Out)         65417  DIAB2 - [facility] CMP A1C LIPID AND MICRO ALBUM CR RATIO: 75720,[id number]  (Send-Out)         42887  TSH - [facility] TSH  (Send-Out)         FUTURE  Future order to be done at patients convenience  (Send-Out)         43164  FERR - [facility] Ferritin Serum  (Send-Out)         52625  FOL - [facility] Folate; folic acid serum  (Send-Out)         35041  RETEC - [facility] Reticulocyte count  (Send-Out)         04842  IRONP - [facility] Iron and TIBC  (Send-Out)         20260  VB12 - [facility] Vitamin B12  (Send-Out)         FUTURE  Future order to be done at patients convenience  (Send-Out)         85148  RAPII - [facility] Arthritis Profile  (Send-Out)         APPTO  Appointment need  (In-House)         FUTURE  Future order to be done at patients convenience  (Send-Out)         64433  BRNAP - [facility] B-Type Natriurectic peptide  (Send-Out)                   PLAN:          Type 2 diabetes Basic labs ordered today and will adjust medications if needed. Chart review seems to indicate DM 2 is well controlled.         FOLLOW-UP: Schedule a follow-up appointment in 1 week..      FOLLOW-UP TESTING #1: FOLLOW-UP LABORATORY:  Labs to be scheduled in the future include CBC, Diabetes Panel 2;CMP, A1C, Lipid, Microalbumin:Creatinine Ratio, and TSH.            Orders:       FUTURE  Future order to be done at patients convenience  (Send-Out)         76097  BDCBC - [facility] CBC with 3 part diff  (Send-Out)         26593  DIAB2 - [facility] CMP A1C LIPID AND MICRO ALBUM CR RATIO: 36543,07363,30976,[id number]  (Send-Out)         19855  TSH - [facility] TSH  (Send-Out)         APPTO  Appointment need  (In-House)            Other specified iron  deficiency anemia Will check iron level and switch patient to ferrous sulfate with vitamin C.         FOLLOW-UP TESTING #1: FOLLOW-UP LABORATORY:  Labs to be scheduled in the future include Anemia profile ferritin Folate Reticulocyte ct Serum iron Vitamin B12.            Prescriptions:       Ferrous Sulfate 325mg Tablets 1 tab bid with food  #60 (Sixty) tablet(s) Refills: 2       Vitamin C (Ascorbic Acid Vitamin C) 250mg Tablet 1 tab BID with iron supplement  #60 (Sixty) tablet(s) Refills: 2           Orders:       FUTURE  Future order to be done at patients convenience  (Send-Out)         58628  FERR - [facility] Ferritin Serum  (Send-Out)         99959  FOL - [facility] Folate; folic acid serum  (Send-Out)         48259  RETEC - [facility] Reticulocyte count  (Send-Out)         83962  IRONP - [facility] Iron and TIBC  (Send-Out)         75216  VB12 - [facility] Vitamin B12  (Send-Out)            Hyperlipidemia Cont atorvastatin.           Prescriptions:       Refill of: Atorvastatin Calcium 40mg Tablet 1 tab daily  #90 (Ninety) tablet(s) Refills: 2          Gout, unspecified Cont uloric and will check uric acid level with arthritis panel.         FOLLOW-UP TESTING #1: FOLLOW-UP LABORATORY:  Labs to be scheduled in the future include Arthritis Panel.            Prescriptions:       Uloric (Febuxostat) 40mg Tablet Take 1 tablet(s) by mouth daily  #30 (Thirty) tablet(s) Refills: 1           Orders:       FUTURE  Future order to be done at patients convenience  (Send-Out)         11543  RAPII - [facility] Arthritis Profile  (Send-Out)            Unspecified inflammatory polyarthritis Will check arthritis panel.          GERD We are stopping omeprazole and continuing ranitidine.           Prescriptions:       Refill of: Ranitidine 150mg Tablet 1 tab bid  #180 (One [place] and Eighty) tablet(s) Refills: 1          Combined systolic and diastolic heart failure, chronic Will check BNP to establish baseline as pt is currently at baseline health and feels well.          FOLLOW-UP TESTING #1: FOLLOW-UP LABORATORY:  Labs to be scheduled in the future include BNP.            Orders:       FUTURE  Future order to be done at patients convenience  (Send-Out)         897[facility]  [facility]  ([facility])               Patient Recommendations:        For  Type 2 diabetes:     Schedule a follow-up visit in 1 week.                APPOINTMENT INFORMATION:        Monday Tuesday Wednesday Thursday Friday Saturday Sunday            Time:___________________AM  PM   Date:_____________________         The following laboratory testing has been ordered: CBC TSH         For  Other specified iron deficiency anemia:             The following laboratory testing has been ordered:         For  Gout, unspecified:             The following laboratory testing has been ordered:         For  Combined systolic and diastolic heart failure, chronic:             The following laboratory testing has been ordered:             CHARGE CAPTURE           **Please note: ICD descriptions below are intended for billing purposes only and may not represent clinical diagnoses**        Primary Diagnosis:         250.00 Type 2 diabetes            E11.9    Type 2 diabetes mellitus without complications              Orders:          11734   Office/outpatient visit; established patient, level 4  (In-House)             APPTO   Appointment need  (In-House)           280.8 Other specified iron deficiency anemia            D50.8    Other iron deficiency anemias    272.4 Hyperlipidemia            E78.5    Hyperlipidemia, unspecified    274.9 Gout, unspecified            M10.9    Gout, unspecified    714.9 Unspecified inflammatory polyarthritis            M06.4    Inflammatory polyarthropathy    530.81 GERD            R12    Heartburn    428.42 Combined systolic and diastolic heart failure, chronic            I50.42    Chronic combined systolic (congestive) and diastolic (congestive) heart failure

## 2021-05-18 NOTE — PROGRESS NOTES
Fidel Cleary  1935     Office/Outpatient Visit    Visit Date: Thu, Jul 2, 2020 11:07 am    Provider: Rocco Haskins MD (Assistant: Chanell Mao MA)    Location: [facility]        Electronically signed by Rocco Haskins MD on  07/29/2020 08:26:31 AM                             Subjective:        CC: Mr. Cleary is a 85 year old White male.  2 month Follow up         HPI: Pt has weakness in both legs and they give out on him. He must use a walker or cane to walk due to leg weakness and he is off balance.      BP today is 104/36 with a HR of 73. Pt is on metoprolol 12.5 mg BID and lasix 40 mg BID. All other BP meds (lisinopril 10 gm qd, spironolactone 12.5 mg qd) were d/c'd during hospitalization.      A1c was 6.9 on 1/17/20. He is on lantus 2 units BID but might increase to 4 if glucose is elevated. He checks glucose BID and its often 100-200.      Pt has end stage CKD and is on dialysis MWF at [facility] in [place]. Urine output has decreased. Cr was 2.79 most recently. He sees Dr. Dumont.      Pt has profound anemia, with hgb 8.1 on 5/7/20. He reports hgb was 7.3 about a week ago at dialysis. He was getting EPO injections with nephrology. He has not seen  since March. Platelets are low but stable at 64. He is taking ferrous sulfate TID with vitamin C. Pt was receiving iron infusions until he had a reaction to this.      Pt has restarted lasix 40 mg BID according to Dr. Fontana and in agreement with nephrology and cardiology. This is based on his weight but he's taken it BID for 3 weeks. Breathing and edema has improved. Walking distance has improved some with lasix. He is prescribed 5L continuously but he only wears 2L during the day and 5L at night. ECHO 1/24/20 showed EF 25-30%, grade 2 diastolic dysfunction. significant biventricular dysfunction, LVH, severe pulmonary artery regurg.    ROS:     CONSTITUTIONAL:  Positive for fatigue ( severe ).   Negative for fever.       EYES:  Negative for blurred vision.      E/N/T:  Negative for diminished hearing and nasal congestion.      CARDIOVASCULAR:  Negative for chest pain, orthopnea ( resolved with lasix ) and palpitations.      RESPIRATORY:  Positive for dyspnea ( with mild exertion ).   Negative for recent cough.      GASTROINTESTINAL:  Positive for melena ( he is on iron tabs ).   Negative for abdominal pain, constipation, diarrhea, hematochezia, nausea or vomiting.      GENITOURINARY:  Negative for dysuria, hematuria and urinary retention.      MUSCULOSKELETAL:  Positive for arthralgias and (diffuse) back pain.   Negative for myalgias.      NEUROLOGICAL:  Positive for weakness ( generalized; bilateral lower extremity ) and feels off balance.   Negative for paresthesias.      PSYCHIATRIC:  Negative for anxiety, depression and sleep disturbance.          Past Medical History / Family History / Social History:         Last Reviewed on 2020 02:46 PM by Rocco Haskins    Past Medical History:             PAST MEDICAL HISTORY         Congestive Heart Failure: dx'd in -;     Coronary Artery Disease: dx'd in ;     Chronic Renal Failure: dx'd in ;     diabetic ucler left toe 17         Surgical History:         Cataract Removal: ;     left toe amputated 3-18-18;     Other Surgeries:    lipoma right arm 3064-2520;    CABG 2004;    penile implant 2006;    Bladder cancer surgery ;     Procedures: cardiac stents 2003 vascular surgery left leg 2018 and Candi          Family History:     Father:  at age 78; Cause of death was heart related     Mother:  at age 89     Brother(s): 4 brother(s) total; 4 ;  Arrhythmia;  COPD;  Parkinson's Disease;  MVA age 19     Sister(s): 1 sister(s) total; 1 ;  heart related     Son(s): 1 son(s) total; 1 ;  Leukemia (  age 38 )     Daughter(s): Healthy; 1 daughter(s) total         Social History:     Occupation:  Retired (Prior occupation: [employer] )     Marital Status: / /remarried     Children: 2 children         Tobacco/Alcohol/Supplements:     Last Reviewed on 7/09/2020 02:46 PM by Rocco Haskins    Tobacco: He has a past history of cigarette smoking; quit date:  1990.          Substance Abuse History:     Last Reviewed on 7/09/2020 02:46 PM by Rocco Haskins        Mental Health History:     Last Reviewed on 7/09/2020 02:46 PM by Rocco Haskins        Communicable Diseases (eg STDs):     Last Reviewed on 7/09/2020 02:46 PM by Rocco Haskins        Current Problems:     Last Reviewed on 7/09/2020 02:46 PM by Rocco Haskins    Hypothyroidism, unspecified    Type 2 diabetes mellitus without complications    Other iron deficiency anemias    Atherosclerotic heart disease of native coronary artery without angina pectoris    Chronic combined systolic (congestive) and diastolic (congestive) heart failure    Peripheral vascular disease, unspecified    Inflammatory polyarthropathy    Gout, unspecified    Heartburn    Hyperlipidemia, unspecified    Low back pain    Chronic kidney disease, stage 5    Neoplasm of uncertain behavior of left kidney    Anemia, unspecified    Obstructive sleep apnea (adult) (pediatric)    Essential (primary) hypertension    Encounter for immunization    Encounter for follow-up examination after completed treatment for conditions other than malignant neoplasm    Urinary tract infection, site not specified    Prediabetes    Gross hematuria    End stage renal disease    Anemia in chronic kidney disease        Immunizations:     Influenza, high dose seasonal 12/26/2019    pneumococcal polysaccharide PPV23 (PNEUMOVAX 23) 11/13/2019    Fluzone High-Dose pf (>=65 yr) 11/1/2018        Allergies:     Last Reviewed on 7/09/2020 02:46 PM by Rocco Haskins    Naprosyn:      mold:      Dust mites:          Current Medications:     Last Reviewed on 7/09/2020 02:46 PM by Jozef  "Rocco HALL    docusate sodium 100 mg oral capsule [take 1 capsule (100 mg) by oral route 2 times per day]    insulin lispro 100 unit/mL subcutaneous Solution [inject by subcutaneous route per prescriber's instructions. Insulin dosing requires individualization.]    polyethylene glycoL 3350 17 gram oral Powder in Packet [take 1 packet (17 gram) mixed with 8 oz. water, juice, soda, coffee or tea by oral route twice daily]    Finasteride 5 mg oral tablet [Take 1 tablet(s) by mouth daily for prostate]    Bone D3 Immune 4000mg tablet po daily @ 12n     Multivitamin tablet po daily      Nitrostat 0.4 mg Sublingual Tablet, Sublingual [as directed]    atorvastatin 40 mg oral tablet [TAKE 1 TABLET DAILY]    gabapentin 300 mg oral capsule [1 tablet po qhs]    tamsulosin 0.4 mg oral capsule [take 1 capsule (0.4 mg) by oral route once daily 1/2 hour following the same meal each day]    levothyroxine 175 mcg oral tablet [TAKE 1 TABLET DAILY]    metoprolol succinate 25 mg oral Tablet, Extended Release 24 hr [Take 1/2 tablet daily]    Uloric 40 mg oral tablet [TAKE 1 TABLET DAILY]    Vitamin C 250 mg oral tablet [1 tab TID with iron supplement]    Blood Glucose Test strips  [Check blood sugars twice daily. Dispense brand covered by insurance. E11.9]    lisinopril 10 mg oral tablet [take 1 tablet (10 mg) by oral route once daily]    Pen Needle 31 gauge x 1/4\"  [use as directed ]    famotidine 20 mg oral tablet [take 1 tablet (20 mg) by oral route 1 times per day]    furosemide 80 mg oral tablet [take 1 tablet (80 mg) by oral route 2 times per day]        Objective:        Vitals:         Current: 7/2/2020 11:15:28 AM    Ht:  5 ft, 10 in;  Wt: 224.6 lbs;  BMI: 32.2T: 97.9 F (oral);  BP: 104/36 mm Hg (right arm, sitting);  P: 73 bpm (right arm (BP Cuff), sitting);  sCr: 2.79 mg/dL;  GFR: 21.26O2 Sat: 92 % (2 liters O2)        Repeat:     12:28:41 PM  BP:   95/44mm Hg (left arm, sitting, PULSE 55)     Exams:     PHYSICAL EXAM:     " "GENERAL: Vitals recorded well developed, well nourished,  mildly obese;  well groomed;  no apparent distress, tired-appearing;     EYES: extraocular movements intact; conjunctiva and cornea are normal; PERRLA;     E/N/T: OROPHARYNX:  normal mucosa, dentition, gingiva, and posterior pharynx;     NECK: range of motion is normal; trachea is midline; thyroid is non-palpable;     RESPIRATORY: normal respiratory rate and pattern with no distress; rales (\"crackles\") present in the bases;     CARDIOVASCULAR: normal rate; rhythm is regular;  no systolic murmur;     GASTROINTESTINAL: nontender; slightly distended; normal bowel sounds;     MUSCULOSKELETAL: gait: uses a cane;  normal overall tone spine: no scoliosis or other abnormal spinal curvatures;     NEUROLOGIC: mental status: alert and oriented x 3; GROSSLY INTACT     PSYCHIATRIC:  appropriate affect and demeanor; normal speech pattern; grossly normal memory;         Assessment:         I10   Essential (primary) hypertension       E11.9   Type 2 diabetes mellitus without complications       N18.5   Chronic kidney disease, stage 5       D63.1   Anemia in chronic kidney disease       I50.42   Chronic combined systolic (congestive) and diastolic (congestive) heart failure           ORDERS:         Lab Orders:       97742  BDCBC - [facility] CBC with 3 part diff  (Send-Out)            48245  COMP - [facility] Comp. Metabolic Panel  (Send-Out)            40574  A1CEG - [facility] Hemoglobin A1C  (Send-Out)            40352  TSH - [facility] TSH  (Send-Out)            80661  FERR - [facility] Ferritin Serum  (Send-Out)            26466  FOL - [facility] Folate; folic acid serum  (Send-Out)            24240  RETEC - [facility] Reticulocyte count  (Send-Out)            42735  IRONP - [facility] Iron and TIBC  (Send-Out)            44347  VB12 - [facility] Vitamin B12  (Send-Out)            05678  NTBNP - [facility] B-Type Natriurectic peptide  (Send-Out)              Procedures Ordered:       REFER  Referral to Specialist or Other Facility  " (Send-Out)            REFER  Referral to Specialist or Other Facility  (Send-Out)                      Plan:         Essential (primary) hypertensionBP and HR are well controlled, cont metoprolol 12.5 mg BID and lasix 40 mg BID.         Type 2 diabetes mellitus without complicationsDM 2 is well controlled, his current regimen of lantus 2 units BID but might increase to 4 if glucose is elevated is reasonable. Will cont to monitor.     LABORATORY:  Labs ordered to be performed today include CBC, Comprehensive metabolic panel, HgbA1C, and TSH.            Orders:       73141  BDCBC - [facility] CBC with 3 part diff  (Send-Out)            63886  COMP - [facility] Comp. Metabolic Panel  (Send-Out)            44591  A1CEG - [facility] Hemoglobin A1C  (Send-Out)            57522  TSH - [facility] TSH  (Send-Out)              Chronic kidney disease, stage 5Pt continues to be very ill, with end stage CKD, severe heart failure, and profound anemia. Cont dialysis MWF at [facility] here in [place] and f/u with specialists as scheduled.         Anemia in chronic kidney diseasePt continues to have profound anemia and thrombocytopenia. Pt referred to  to help set up a f/u apt and devise a long term plan as to how to manage his anemia and thrombocytopenia. Perhaps resume EPO injections, cont ferrous sulfate TID with vitamin C.    LABORATORY:  Labs ordered to be performed today include Anemia profile ferritin Folate Reticulocyte ct Serum iron Vitamin B12.      REFERRALS:  Referral initiated to a hematologist ( Dr. Tj Spencer;  ).            Orders:       REFER  Referral to Specialist or Other Facility  (Send-Out)            09576  FERR - [facility] Ferritin Serum  (Send-Out)            24057  FOL - [facility] Folate; folic acid serum  (Send-Out)            49772  RETEC - [facility] Reticulocyte count  (Send-Out)            17541  IRONP - [facility] Iron and TIBC  (Send-Out)            24202  VB12 - [facility] Vitamin B12  (Send-Out)              Chronic combined systolic  (congestive) and diastolic (congestive) heart failureSeverely reduced EF of 25-30% per ECHO on 1/24/20. Cont metoprolol 12.5 mg BID, lasix 40 mg BID and supplemental O2.     LABORATORY:  Labs ordered to be performed today include BNP.      REFERRALS:  Referral initiated to a cardiologist ( Dr. Neil Amaya, [facility] ).            Orders:       REFER  Referral to Specialist or Other Facility  (Send-Out)            32363  [facility] B-Type Natriurectic peptide  (Send-Out)                  Charge Capture:         Primary Diagnosis:     I10  Essential (primary) hypertension           Orders:      57374  Office/outpatient visit; established patient, level 4  (In-House)              E11.9  Type 2 diabetes mellitus without complications     N18.5  Chronic kidney disease, stage 5     D63.1  Anemia in chronic kidney disease     I50.42  Chronic combined systolic (congestive) and diastolic (congestive) heart failure

## 2021-05-18 NOTE — PROGRESS NOTES
Fidel Cleary 1935     Office/Outpatient Visit    Visit Date: Wed, Sep 25, 2019 01:07 pm    Provider: Rocco Haskins MD (Assistant: Shadia Argueta MA)    Location: [facility]        Electronically signed by Rocco Haskins MD on  09/25/2019 07:49:28 PM                             SUBJECTIVE:        CC: (PATIENT STATES HE IS ONLY TAKING LASIX PRN)     Mr. Cleary is a 84 year old White male.  He is here today following a transition of care from an inpatient hospital: [facility]. The patient was admitted on 9/3-13/2019. The patient was admitted for CHF. Our office called the patient within 48 hours of discharge and scheduled the follow-up appointment. During the patient's hospital stay the patient was treated by Dr. Jarvis. Medications have been reviewed and reconciled with discharge summary..          HPI: 84-year-old  male w/CAD status post multiple stents and CABG in 2003, CKD stage III, diabetes type 2, diastolic heart failure, and suspected renal cell carcinoma.     Kidney function appears to be worsening, with baseline Cr between 2-3. During hospitalization Cr peaked at 4.0. Pt has seen Dr. Dumont twice since discharge, including 9/17 and 9/23 and will f/u with Dr. Dumont in 1 month (October). Pt reports Dr. Dumont wants me to order a blood transfusion before his left renal mass can be operated on. No change in urine output.     Pt admitted to [facility] from 9/3 to 9/13 for hypoxemic respiratory failure 2/2 decompensated combined type heart failure. Also found to have STARLA, anemia requiring transfusion of 1 unit PRBCs. Hospitalization prolonged 2/2 hypoxia and bradycardia. Pt discharged with intent to f/u with Cardiology, Pulmonary, Nephrology and possibly pursue surgery to address renal cell carcinoma with urologist Dr. Tolentino. Prior to hospitalization pt was on 2L of O2 by NC and this was increased to 5L continuously (although pt has weaned himself down to 2L with O2 of 88% here in  clinic). During hospitalization pt had EGD and scope showed esophagitis and gastritis for which PPI therapy was prescribed. Capsule endoscopy was performed during hospitalization and results are pending. Pt had an episode of unresponsiveness during hospitalization that was thought to be 2/2 bradycardia vs vasovagal vs hyperkalemia (although believes he coded and flat-lined). He continues to have shortness of breath with moderate exertion although he has no significant BLE. Pt will f/u with Dr. Amaya next week (maybe 9/30/19).     A1c was 5.8 on labs 7/17/19. He is on lantus 8 units BID and no other DM 2 medications. He checks his glucose BID and its typically 120 in the morning and at night it 130, might be has high as 160 at night. He used to have very low AM readings. Last time we discussed diet and they are eliminating cinnamon toast crunch. He is eating a little less fruit.     Pt was admitted to [facility] on 9/3 with hgb of 7.5. Hgb dropped to 6.6 on 9/10 and pt was given 1 unit PRBCs. Hgb remained relatively stable for the remainder of admission, ranging from 7.5 -9.0 until discharge on 9/13. Pt reports melena since 9/1 and this has persisted since discharge. He had EGD which did not show any bleeding and capsule endoscopy and results are pending from this. He will see Dr. Echevarria on 10/7 to f/u about capsule endoscopy. Colonoscopy not performed during admission to avoid bowel perforation. A mass was detected on kidney concerning for renal cell carcinoma and it is thought this could be the source of patient's anemia.     Pt had MRI abdomen on 8/21/19 (prior to hospitalization) showing solid appearing left renal mass measuring 3.5 x 3.7 cm in size highly suspicious for renal cell carcinoma. Biopsy is recommended. Pt is seeing urologist Kinjal Tolentino.     ROS:     CONSTITUTIONAL:  Positive for fatigue ( severe ).   Negative for fever.      EYES:  Negative for blurred vision.      E/N/T:  Negative for diminished  hearing and nasal congestion.      CARDIOVASCULAR:  Negative for chest pain and palpitations.      RESPIRATORY:  Positive for dyspnea ( with moderate exertion; with mild exertion ).   Negative for recent cough.      GASTROINTESTINAL:  Positive for heartburn ( better with zantac ) and melena.   Negative for abdominal pain, constipation, diarrhea, hematochezia, nausea or vomiting.      GENITOURINARY:  Negative for dysuria.      MUSCULOSKELETAL:  Positive for arthralgias and (diffuse) back pain.   Negative for myalgias.      INTEGUMENTARY:  Negative for atypical mole(s) and rash.      NEUROLOGICAL:  Negative for paresthesias and weakness.      PSYCHIATRIC:  Negative for anxiety, depression and sleep disturbance.          PMH/FMH/SH:     Last Reviewed on 2019 07:20 PM by Rocco Haskins    Past Medical History:             PAST MEDICAL HISTORY         Congestive Heart Failure: dx'd in ;     Coronary Artery Disease: dx'd in ;     Chronic Renal Failure: dx'd in ;     diabetic ucler left toe 17         Surgical History:         Cataract Removal: ;      left toe amputated 3-18-18;     Other Surgeries:    lipoma right arm 5453-1050;    CABG 2004;    penile implant 2006;    Bladder cancer surgery ;     Procedures: cardiac stents 2003 vascular surgery left leg 2018 and Candi          Family History:     Father:  at age 78; Cause of death was heart related     Mother:  at age 89     Brother(s): 4 brother(s) total; 4 ;  Arrhythmia;  COPD;  Parkinson's Disease;  MVA age 19     Sister(s): 1 sister(s) total; 1 ;  heart related     Son(s): 1 son(s) total; 1 ;  Leukemia (  age 38 )     Daughter(s): Healthy; 1 daughter(s) total         Social History:     Occupation: Retired (Prior occupation: [employer] )     Marital Status: / /remarried     Children: 2 children         Tobacco/Alcohol/Supplements:     Last  Reviewed on 9/25/2019 07:20 PM by Rocco Haskins    Tobacco: He has a past history of cigarette smoking; quit date:  1990.          Substance Abuse History:     Last Reviewed on 9/25/2019 07:20 PM by Rocco Haskins        Mental Health History:     Last Reviewed on 9/25/2019 07:20 PM by Rocco Haskins        Communicable Diseases (eg STDs):     Last Reviewed on 9/25/2019 07:20 PM by Rocco Haskins            Current Problems:     Last Reviewed on 9/25/2019 07:20 PM by Rocco Haskins    Renal mass     Profound anemia     Chronic low back pain     Anemia, unspecified     Chronic kidney disease, Stage III (moderate)     Combined systolic and diastolic heart failure, chronic     GERD     Unspecified inflammatory polyarthritis     Gout, unspecified     Hyperlipidemia     Coronary artery disease     Congestive heart failure     Unspecified PVD     Acquired hypothyroidism, other specified cause     Type 2 diabetes     Follow-up examination     Other specified iron deficiency anemia         Immunizations:     Fluzone High-Dose pf (>=65 yr) 11/1/2018         Allergies:     Last Reviewed on 9/25/2019 07:20 PM by Rocco Haskins    Naprosyn:    mold:    Dust mites:        Current Medications:     Last Reviewed on 9/25/2019 07:20 PM by Rocco Haskins    Finasteride 5mg Tablet Take 1 tablet(s) by mouth daily for prostate     Synthroid 0.175mg Tablet Take 1 tablet(s) by mouth daily     Lantus SoloSTAR 100units/1ml Injection 8 units BID     Atorvastatin Calcium 40mg Tablet 1 tab daily     Ferrous Sulfate 325mg Tablets 1 tab bid with food     Vitamin C 250mg Tablets 1 tab BID with iron supplement     Mag-Ox 400 400mg Tablet 1 po qd     Bone D3 Immune 4000mg tablet po daily @ 12n     Fish Oil     Gabapentin 300mg Capsules 1 tablet po qhs     Metoprolol 25mg Tablets, Extended Release Take 1/2 tablet daily     Multivitamin tablet po daily     Nitrostat 0.4mg Tablets, Sublingual as directed     Tamsulosin HCl 0.4mg  Capsules 1 tab daily         OBJECTIVE:        Vitals:         Current: 9/25/2019 1:21:35 PM    Ht:  5 ft, 10 in;  Wt: 236.2 lbs;  BMI: 33.9    T: 97.5 F;  BP: 131/49 mm Hg (left arm, sitting);  P: 75 bpm (left arm (BP Cuff), sitting);  sCr: 2.3 mg/dL;  GFR: 26.80    O2 Sat: 88 % (2 liters O2)        Exams:     PHYSICAL EXAM:     GENERAL: Vitals recorded well developed, well nourished,  mildly obese;  well groomed;  no apparent distress;     EYES: extraocular movements intact; conjunctiva and cornea are normal; PERRLA;     E/N/T: OROPHARYNX:  normal mucosa, dentition, gingiva, and posterior pharynx;     RESPIRATORY: normal respiratory rate and pattern with no distress; normal breath sounds with no rales, rhonchi, wheezes or rubs;     CARDIOVASCULAR: normal rate; rhythm is regular;  no systolic murmur; 1+ pedal edema;     GASTROINTESTINAL: nontender; normal bowel sounds;     MUSCULOSKELETAL: gait: uses a walker;  normal overall tone straight leg raise negative bilaterally;     NEUROLOGIC: mental status: alert and oriented x 3;     PSYCHIATRIC:  appropriate affect and demeanor; normal speech pattern; grossly normal memory;         ASSESSMENT           585.3   N18.3  Chronic kidney disease, Stage III (moderate)              DDx:     428.42   I50.42  Combined systolic and diastolic heart failure, chronic              DDx:     250.00   E11.9  Type 2 diabetes              DDx:     285.9   D64.9  Profound anemia              DDx:     236.91   D41.02  Renal mass              DDx:         ORDERS:         Lab Orders:       62325  BDCBC - [facility] CBC with 3 part diff  (Send-Out)         39477  FERR - [facility] Ferritin Serum  (Send-Out)         43221  FOL - [facility] Folate; folic acid serum  (Send-Out)         03680  RETEC - [facility] Reticulocyte count  (Send-Out)         77201  IRONP - [facility] Iron and TIBC  (Send-Out)         62861  VB12 - [facility] Vitamin B12  (Send-Out)         16402  NTBNP - [facility] B-Type Natriurectic peptide  (Send-Out)         57606   COMP - [facility] Comp. Metabolic Panel  (Send-Out)         APPTO  Appointment need  (In-House)           Procedures Ordered:       REFER  Referral to Specialist or Other Facility  (Send-Out)                   PLAN:          Chronic kidney disease, Stage III (moderate) Cr has been increasing and pt appears to be developing cardio-renal syndrome with poor kidney perfusion. Cont close f/u with gordo Dumont and we will request records. Basic labs ordered.         FOLLOW-UP: Schedule a follow-up appointment in 2 weeks.:.            Orders:       APPTO  Appointment need  (In-House)            Combined systolic and diastolic heart failure, chronic Will check basic labs and BNP. Heart failure appears to be stable and complicated by several factors including declining renal function and acute blood loss from either small bowel GI bleed or renal mass. F/u with Dr. Amaya in 5 days.     LABORATORY:  Labs ordered to be performed today include BNP and Comprehensive metabolic panel.      REFERRALS:  Referral initiated to home health services ( at [facility] ) and physical therapy ( Home health ).            Orders:       53986  NTBNP - [facility] B-Type Natriurectic peptide  (Send-Out)         09581  COMP - [facility] Comp. Metabolic Panel  (Send-Out)         REFER  Referral to Specialist or Other Facility  (Send-Out)            Type 2 diabetes DM 2 appears to be well controlled. Cont current regimen.          Profound anemia Will check labs. Unclear if this is a small bowel indolent bleed or hemorrhage from RCC. Pt advised that I cannot order a blood transfusion without checking labs and I am not clear why nephrologist Dr. Dumont would ask me to order an OP transfusion instead of arranging this himself. Records from Dr. Dumont requested.     LABORATORY:  Labs ordered to be performed today include Anemia profile CBC ferritin Folate Reticulocyte ct Serum iron Vitamin B12.            Orders:       23055  [facility] CBC with 3 part diff  (Send-Out)          05831  FERR - [facility] Ferritin Serum  (Send-Out)         70037  FOL - [facility] Folate; folic acid serum  (Send-Out)         37920  RETEC - [facility] Reticulocyte count  (Send-Out)         89747  IRONP - [facility] Iron and TIBC  (Send-Out)         23799  VB12 - [facility] Vitamin B12  (Send-Out)            Renal mass f/u with urologist Dr. Tolentino.             Patient Recommendations:        For  Chronic kidney disease, Stage III (moderate):     Schedule a follow-up visit in 2 weeks.                APPOINTMENT INFORMATION:        Monday Tuesday Wednesday Thursday Friday Saturday Sunday            Time:___________________AM  PM   Date:_____________________             CHARGE CAPTURE           **Please note: ICD descriptions below are intended for billing purposes only and may not represent clinical diagnoses**        Primary Diagnosis:         585.3 Chronic kidney disease, Stage III (moderate)            N18.3    Chronic kidney disease, stage 3 (moderate)              Orders:          93721   Office/outpatient visit; established patient, level 4  (In-House)             APPTO   Appointment need  (In-House)           428.42 Combined systolic and diastolic heart failure, chronic            I50.42    Chronic combined systolic (congestive) and diastolic (congestive) heart failure    250.00 Type 2 diabetes            E11.9    Type 2 diabetes mellitus without complications    285.9 Profound anemia            D64.9    Anemia, unspecified    236.91 Renal mass            D41.02    Neoplasm of uncertain behavior of left kidney        ADDENDUMS:      ____________________________________    Addendum: 09/27/2019 02:51 PM - Rocco Haskins        ADDENDUM: Add 11062; Remove [id number]         Addendum: 10/17/2019 12:16 PM - Six, Team         Visit Note Faxed to:        User Entered Recipient; Number [phone]

## 2021-05-18 NOTE — PROGRESS NOTES
"Fidel Cleary 1935     Office/Outpatient Visit    Visit Date: Wed, Oct 16, 2019 01:39 pm    Provider: Rocco Haskins MD (Assistant: Rosa Sandra)    Location: [facility]        Electronically signed by Rocco Haskins MD on  10/27/2019 02:17:36 PM                             SUBJECTIVE:        CC:     Mr. Cleary is a 84 year old White male.  2 week follow up (PT STATES HE IS TAKING GABAPENTIN 300MG QHS NOT 100MG TID)         HPI: 84-year-old  male w/CAD status post multiple stents and CABG in 2003, CKD stage III, diabetes type 2, diastolic heart failure, and suspected renal cell carcinoma.     Kidney function recently in decline, with baseline Cr between 2-3. During hospitalization Cr peaked at 4.0. Cr seems to have leveled off, was 2.36 after our last visit and 1.6 on labs yesterday. Pt is following closely with Dr. Dumont. No change in urine output.     Pt denies shortness of breath unless \"walking a good distance\". Walking to mail box and back may or may not make him short of breath. He denies significant BLE edema. He is prescribed 5L continuously but he only wears 2L when he feels he needs it. He was recently admitted to [facility] from 9/3 to 9/13 for hypoxemic respiratory failure 2/2 decompensated combined type heart failure, STARLA on CKD, and profound anemia. Pt saw Dr. Amaya on 10/1/19 and no significant changes were made: Cont beta blocker (metoprolol 12.5 mg qd) and statin. ASA held 2/2 GI bleed. Diuretics on hold 2/2 CKD.     A1c was 5.8 on labs 7/17/19 and was 5.2 yesterday (10/15/19). He is on lantus 8 units BID and no other DM 2 medications. He checks his glucose BID and its typically 120 in the morning and at night it 130, might be has high as 160 at night. He used to have very low AM readings. Last time we discussed diet and they are eliminating cinnamon toast crunch. He is eating a little less fruit.     Pt had MRI abdomen on 8/21/19 (prior to hospitalization) showing solid " "appearing left renal mass measuring 3.5 x 3.7 cm in size highly suspicious for renal cell carcinoma. Biopsy is recommended. Pt is seeing urologist Kinjal Tolentino. He is in the process of getting surgical clearance from cardiology and pulmonology for nephrectomy or partial nephrectomy.     Pt reports stools are not as black as before but are still dark. He denies dizziness or lightheadedness. Hgb has been as low as 6.6 on 9/10 and this has remained stable since transfusion of 1 unit PRBCs during hospitalization at [facility]. EGD 9/4/19 showed gastritis Schatzki's ring and grade 1 esophagitis, no bleeding observed. Colonoscopy not performed during admission to avoid bowel perforation. Pt saw Dr. 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 He is on metoprolol 25 mg 1/2 tab qd.     Pt has BLAS, his last sleep study was done in [place] several years ago. Pt uses a CPAP and his current machine was acquired over 10 years ago. Pt feels a good benefit from his CPAP but lately it has not been working correctly and he would like a new machine.     ROS:     CONSTITUTIONAL:  Positive for fatigue ( severe ).   Negative for fever.      EYES:  Negative for blurred vision.      E/N/T:  Negative for diminished hearing and nasal congestion.      CARDIOVASCULAR:  Positive for orthopnea.   Negative for chest pain or palpitations.      RESPIRATORY:  Positive for dyspnea ( with moderate exertion; with mild exertion ).   Negative for recent cough.      GASTROINTESTINAL:  Positive for heartburn ( better with zantac ) and melena ( improving ).   Negative for abdominal pain, constipation, diarrhea, hematochezia, nausea or vomiting.      GENITOURINARY:  Negative for dysuria.      MUSCULOSKELETAL:  Positive for arthralgias and (diffuse) back pain.   Negative for myalgias.      INTEGUMENTARY: "  Negative for atypical mole(s) and rash.      NEUROLOGICAL:  Negative for paresthesias and weakness.      PSYCHIATRIC:  Negative for anxiety, depression and sleep disturbance.          PMH/FMH/SH:     Last Reviewed on 2019 07:20 PM by Rocco Haskins    Past Medical History:             PAST MEDICAL HISTORY         Congestive Heart Failure: dx'd in -;     Coronary Artery Disease: dx'd in ;     Chronic Renal Failure: dx'd in ;     diabetic ucler left toe 17         Surgical History:         Cataract Removal: ;      left toe amputated 3-18-18;     Other Surgeries:    lipoma right arm [id number];    CABG 2004;    penile implant 2006;    Bladder cancer surgery ;     Procedures: cardiac stents 2003 vascular surgery left leg 2018 and Candi          Family History:     Father:  at age 78; Cause of death was heart related     Mother:  at age 89     Brother(s): 4 brother(s) total; 4 ;  Arrhythmia;  COPD;  Parkinson's Disease;  MVA age 19     Sister(s): 1 sister(s) total; 1 ;  heart related     Son(s): 1 son(s) total; 1 ;  Leukemia (  age 38 )     Daughter(s): Healthy; 1 daughter(s) total         Social History:     Occupation: Retired (Prior occupation: [employer] )     Marital Status: / /remarried     Children: 2 children         Tobacco/Alcohol/Supplements:     Last Reviewed on 2019 07:20 PM by Rocco Haskins    Tobacco: He has a past history of cigarette smoking; quit date:  .          Substance Abuse History:     Last Reviewed on 2019 07:20 PM by Rocco Haskins        Mental Health History:     Last Reviewed on 2019 07:20 PM by Rocco Haskins        Communicable Diseases (eg STDs):     Last Reviewed on 2019 07:20 PM by Rocco Haskins            Current Problems:     Last Reviewed on 2019 07:20 PM by Rocco Haskins    Renal mass     Profound anemia     Chronic low  back pain     Anemia, unspecified     Chronic kidney disease, Stage III (moderate)     Combined systolic and diastolic heart failure, chronic     GERD     Unspecified inflammatory polyarthritis     Gout, unspecified     Hyperlipidemia     Coronary artery disease     Congestive heart failure     Unspecified PVD     Acquired hypothyroidism, other specified cause     Type 2 diabetes     Follow-up examination     Other specified iron deficiency anemia         Immunizations:     Fluzone High-Dose pf (>=65 yr) 11/1/2018         Allergies:     Last Reviewed on 9/25/2019 07:20 PM by Rocco Haskins    Naprosyn:    mold:    Dust mites:        Current Medications:     Last Reviewed on 9/25/2019 07:20 PM by Rocco Haskins    Synthroid 0.175mg Tablet Take 1 tablet(s) by mouth daily     Finasteride 5mg Tablet Take 1 tablet(s) by mouth daily for prostate     Gabapentin 100mg Capsules 1 tab TID prn pain     Uloric 40mg Tablet Take 1 tablet(s) by mouth daily     Glucose Reagent Blood Test Strips  Reagent Strips Check blood sugars twice daily. Dispense brand covered by insurance. E11.9     Ferrous Sulfate 325mg Tablets Take 1 tablet(s) by mouth tid     Vitamin C 250mg Tablets 1 tab TID with iron supplement     Mag-Ox 400 400mg Tablet 1 po qd     Bone D3 Immune 4000mg tablet po daily @ 12n     Fish Oil     Gabapentin 300mg Capsules 1 tablet po qhs     Lantus 100units/1ml Injection 12 units bid     Metoprolol 25mg Tablets, Extended Release Take 1/2 tablet daily     Multivitamin tablet po daily     Nitrostat 0.4mg Tablets, Sublingual as directed     Tamsulosin HCl 0.4mg Capsules 1 tab daily         OBJECTIVE:        Vitals:         Current: 10/16/2019 1:54:56 PM    Ht:  5 ft, 10 in;  Wt: 237.8 lbs;  BMI: 34.1    T: 97.5 F;  BP: 139/57 mm Hg (left arm, sitting);  P: 87 bpm (left arm (BP Cuff), sitting);  sCr: 1.6 mg/dL;  GFR: 38.64    O2 Sat: 94 % (2 liters O2)        Exams:     PHYSICAL EXAM:     GENERAL: Vitals recorded well  developed, well nourished,  mildly obese;  well groomed;  no apparent distress;     EYES: extraocular movements intact; conjunctiva and cornea are normal; PERRLA;     E/N/T: OROPHARYNX:  normal mucosa, dentition, gingiva, and posterior pharynx;     NECK: range of motion is normal; trachea is midline; thyroid is non-palpable;     RESPIRATORY: normal respiratory rate and pattern with no distress; normal breath sounds with no rales, rhonchi, wheezes or rubs;     CARDIOVASCULAR: normal rate; rhythm is regular;  no systolic murmur; 1+ pedal edema;     GASTROINTESTINAL: nontender; normal bowel sounds;     MUSCULOSKELETAL: gait: uses a walker;  normal overall tone     NEUROLOGIC: mental status: alert and oriented x 3; GROSSLY INTACT     PSYCHIATRIC:  appropriate affect and demeanor; normal speech pattern; grossly normal memory;         ASSESSMENT           585.3   N18.3  Chronic kidney disease, Stage III (moderate)              DDx:     428.42   I50.42  Combined systolic and diastolic heart failure, chronic              DDx:     250.00   E11.9  Type 2 diabetes              DDx:     236.91   D41.02  Renal mass              DDx:     285.9   D64.9  Profound anemia              DDx:     401.1   I10  Benign HTN              DDx:     780.57   G47.33  BLAS              DDx:         ORDERS:         Meds Prescribed:       Refill of: Glucose Reagent Blood Test Strips (Glucose Reagent Blood Test Strips) Reagent Strips Check blood sugars twice daily. Dispense brand covered by insurance. E11.9  #100 (One [place]) strip(s) Refills: 2         Lab Orders:       APPTO  Appointment need  (In-House)           Procedures Ordered:       REFER  Referral to Specialist or Other Facility  (Send-Out)         REFER  Referral to Specialist or Other Facility  (Send-Out)           Other Orders:       [id number]  CT Renal Stone Protocol (CT abdomen and pelvis w/o IV contrast) no oral prep  (Send-Out)                   PLAN: Pt is overall in very poor health  with combined heart failure, CKD III or IV, left renal mass concerning for RCC, and an abnormal CBC w/diff showing macrocytic anemia in setting of normal B12 and folic ancid and low iron (as well as worsening thrombocytosis). Depending on the how the next couple months go (i.e. if left renal mass is removed and chronic issues stabilize), pt may need hospice or chronic issues may remain stable. Overall he and his wife are in good spirits.          Chronic kidney disease, Stage III (moderate) CKD III or IV. Diurectics for CHF are held 2/2 CKD. Overall Cr trend here 1.93->2.3->2.36->1.6. We are forwarding our lab results to Dr. Dumont as pt is in need of very close f/u in setting of worsening kidney function and heart failure creating a cardiorenal syndrome making fluid balance difficult.          Combined systolic and diastolic heart failure, chronic Pt is following with Dr. Amaya for combined type CHF and CAD s/p CABG and angioplasty. Most recent visit was 10/1/19 and no significant changes were made: Cont beta blocker (metoprolol 12.5 mg qd) and statin. ASA held 2/2 GI bleed. Diuretics on hold 2/2 CKD. Despite recent hospitalization, CHF and CAD appear stable at the moment.          Type 2 diabetes DM 2 is very stable at the moment and has actually improved from the pre-diabetic range to non-diabetic range A1c (5.8 to 5.2). Lantus 8 units BID will therefore be decreased to 4 units BID to avoid hypoglycemia.           Prescriptions:       Refill of: Glucose Reagent Blood Test Strips (Glucose Reagent Blood Test Strips) Reagent Strips Check blood sugars twice daily. Dispense brand covered by insurance. E11.9  #100 (One [place]) strip(s) Refills: 2          Renal mass Repeat CT Ab/pelvis is ordered to continue monitoring left renal mass concerning for RCC. Pt will f/u with urologist Kinjal Tolentino and he is in the process of getting clearance from from cardiology and pulmonology for nephrectomy or partial nephrectomy.          RADIOLOGY:  I have ordered and CT Renal Stone protocol abdomen and pelvis w/o contrast; no oral prep to be done today.      FOLLOW-UP: Schedule a follow-up visit in 1 month.:.            Orders:       APPTO  Appointment need  (In-House)         92358  CT Renal Stone Protocol (CT abdomen and pelvis w/o IV contrast) no oral prep  (Send-Out)            Profound anemia Pt is referred to heme/onc in setting of left renal mass concerning for RCC, as well as macrocytic anemia with normal B12 and folic acid and low iron and thrombocytopenia.         REFERRALS:  Referral initiated to an oncologist.            Orders:       REFER  Referral to Specialist or Other Facility  (Send-Out)            Benign HTN BP well controlled, cont metoprolol 25 mg 1/2 tab qd.          BLAS         REFERRALS:  Referral initiated to [facility].            Orders:       REFER  Referral to Specialist or Other Facility  (Send-Out)               Patient Recommendations:        For  Renal mass:     Schedule a follow-up visit in 1 month.                APPOINTMENT INFORMATION:        Monday Tuesday Wednesday Thursday Friday Saturday Sunday            Time:___________________AM  PM   Date:_____________________             CHARGE CAPTURE           **Please note: ICD descriptions below are intended for billing purposes only and may not represent clinical diagnoses**        Primary Diagnosis:         585.3 Chronic kidney disease, Stage III (moderate)            N18.3    Chronic kidney disease, stage 3 (moderate)              Orders:          15229   Office/outpatient visit; established patient, level 5  (In-House)           428.42 Combined systolic and diastolic heart failure, chronic            I50.42    Chronic combined systolic (congestive) and diastolic (congestive) heart failure    250.00 Type 2 diabetes            E11.9    Type 2 diabetes mellitus without complications    236.91 Renal mass            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

## 2021-05-18 NOTE — PROGRESS NOTES
"Fidel Cleary  1935     Office/Outpatient Visit    Visit Date: Tue, Sep 29, 2020 09:49 am    Provider: Rocco Haskins MD (Assistant: Shadia Argueta MA)    Location: [facility]        Electronically signed by Rocco Haskins MD on  09/29/2020 04:23:27 PM                             Subjective:        CC: NOT TAKING VITAMIN C, IRONTAKING LANTUS 2 UNITS BID, GABPENTIN 300 ONCE DAILYMr. Cleary is a 85 year old White male.  This is a follow-up visit.  check up         HPI:           PHQ-9 Depression Screening: Completed form scanned and in chart; Total Score 2       BP today is 105/32 with a HR of 74. Recheck is 107/30 with a HR of 70. He is on metoprolol 25 mg 1/2 tab qd and lasix 80 mg 1/2 tab BID.      A1c decreased from 6.9 to 5.5 from 1/17/20 to 7/2/20. He is on lantus 2 units BID but might increase to 4 if glucose is elevated at night. He checks glucose BID and its often 70-80 in the AM and might be 200 or higher at night.      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 Urine output is \"very little.\" Cr was 5.5 on 8/11/20.      Pt had an episode of severe fatigue and lightheadedness about 4 days ago and 1 week ago. He felt like he was going to pass out and was very short of breath. He was visiting his nephew, sitting and talking. Episode happened on standing, he felt very weak. He is on metoprolol 25 mg 1/2 tab qd, lasix 40 mg BID, and supplemental O2 by NC at 5L continuously but he only wears 2L during the day and 5L at night. ECHO 1/24/20 showed EF 25-30%, grade 2 diastolic dysfunction. significant biventricular dysfunction, LVH, severe pulmonary artery regurg.      Pt has profound anemia, with hgb 8.4 on 8/11/20. He sees  once every couple months and he also get EPO shots with nephrology. He was taking ferrous sulfate TID with vitamin C but this was stopped about 1 week ago by someone at [facility], she wants to replace it with something " else. Pt was receiving iron infusions until he had a reaction to this. Platelets are low but stable at 64.      Chronic low back pain. He is on gabapentin 300 mg qHS, its a little better than before. Gabapentin helps the pain and helps him sleep. He describes it as all the way across just above buttocks. Stabbing pain, makes him bend over. Its typically 6/10. He did some PT for this in [place]. This is worse with movement and better as the day goes on.    ROS:     CONSTITUTIONAL:  Positive for fatigue ( severe ).   Negative for fever.      EYES:  Negative for blurred vision.      E/N/T:  Negative for diminished hearing and nasal congestion.      CARDIOVASCULAR:  Negative for chest pain, orthopnea ( resolved with lasix ) and palpitations.      RESPIRATORY:  Positive for dyspnea ( with mild exertion ).   Negative for recent cough.      GASTROINTESTINAL:  Positive for melena ( he is on iron tabs ).   Negative for abdominal pain, constipation, diarrhea, hematochezia, nausea or vomiting.      GENITOURINARY:  Negative for dysuria, hematuria and urinary retention.      MUSCULOSKELETAL:  Positive for arthralgias and (diffuse) back pain ( chronic ).   Negative for myalgias.      NEUROLOGICAL:  Positive for ataxia, weakness ( generalized; bilateral lower extremity ) and feels off balance.   Negative for fainting or paresthesias.      PSYCHIATRIC:  Negative for anxiety, depression and sleep disturbance.          Past Medical History / Family History / Social History:         Last Reviewed on 9/29/2020 10:48 AM by Rocco Haskins    Past Medical History:             PAST MEDICAL HISTORY         Congestive Heart Failure: dx'd in ;     Coronary Artery Disease: dx'd in 2003;     Chronic Renal Failure: dx'd in 4-2017;     diabetic ucler left toe 12-22-17         Surgical History:         Cataract Removal: 2010;     left toe amputated 3-18-18;     Other Surgeries:    lipoma right arm [id number];    CABG June 2004;    penile  implant 2006;    Bladder cancer surgery ;     Procedures: cardiac stents 2003 vascular surgery left leg 2018 and Candi 2018         Family History:     Father:  at age 78; Cause of death was heart related     Mother:  at age 89     Brother(s): 4 brother(s) total; 4 ;  Arrhythmia;  COPD;  Parkinson's Disease;  MVA age 19     Sister(s): 1 sister(s) total; 1 ;  heart related     Son(s): 1 son(s) total; 1 ;  Leukemia (  age 38 )     Daughter(s): Healthy; 1 daughter(s) total         Social History:     Occupation: Retired (Prior occupation: [employer] )     Marital Status: / /remarried     Children: 2 children         Tobacco/Alcohol/Supplements:     Last Reviewed on 2020 10:48 AM by Rocco Haskins    Tobacco: He has a past history of cigarette smoking; quit date:  .          Substance Abuse History:     Last Reviewed on 2020 10:48 AM by Rocco Haskins        Mental Health History:     Last Reviewed on 2020 10:48 AM by Rocco Haskins        Communicable Diseases (eg STDs):     Last Reviewed on 2020 10:48 AM by Rocco Haskins        Current Problems:     Last Reviewed on 2020 10:48 AM by Rocco Haskins    Other iron deficiency anemias    Hypothyroidism, unspecified    Type 2 diabetes mellitus without complications    Peripheral vascular disease, unspecified    Atherosclerotic heart disease of native coronary artery without angina pectoris    Chronic combined systolic (congestive) and diastolic (congestive) heart failure    Heartburn    Inflammatory polyarthropathy    Gout, unspecified    Hyperlipidemia, unspecified    Low back pain    Chronic kidney disease, stage 5    Neoplasm of uncertain behavior of left kidney    Anemia, unspecified    Obstructive sleep apnea (adult) (pediatric)    Essential (primary) hypertension    Encounter for immunization    Encounter for follow-up examination after completed  treatment for conditions other than malignant neoplasm    Urinary tract infection, site not specified    Gross hematuria    Anemia in chronic kidney disease    Encounter for screening for depression        Immunizations:     Influenza, high dose seasonal 12/26/2019    pneumococcal polysaccharide PPV23 (PNEUMOVAX 23) 11/13/2019    Fluzone High-Dose pf (>=65 yr) 11/1/2018    influenza, high-dose, quadrivalent 9/23/2020        Allergies:     Last Reviewed on 9/29/2020 10:48 AM by Rocco Haskins    Naprosyn:      mold:      Dust mites:          Current Medications:     Last Reviewed on 9/29/2020 10:48 AM by Rocco Haskins    docusate sodium 100 mg oral capsule [take 1 capsule (100 mg) by oral route 2 times per day]    insulin lispro 100 unit/mL subcutaneous Solution [inject by subcutaneous route per prescriber's instructions. Insulin dosing requires individualization.]    polyethylene glycoL 3350 17 gram oral Powder in Packet [take 1 packet (17 gram) mixed with 8 oz. water, juice, soda, coffee or tea by oral route twice daily]    Multivitamin tablet po daily      atorvastatin 40 mg oral tablet [TAKE 1 TABLET DAILY]    Nitrostat 0.4 mg Sublingual Tablet, Sublingual [as directed as needed for chest pain up to 3 tabs sublingual]    Finasteride 5 mg oral tablet [Take 1 tablet(s) by mouth daily for prostate]    levothyroxine 175 mcg oral tablet [TAKE 1 TABLET DAILY]    Lantus Solostar U-100 Insulin 100 unit/mL (3 mL) subcutaneous Insulin Pen [INJECT 4 UNITS TWICE A DAY]    gabapentin 300 mg oral capsule [1 tablet po TID]    tamsulosin 0.4 mg oral capsule [take 1 capsule (0.4 mg) by oral route once daily 1/2 hour following the same meal each day]    metoprolol succinate 25 mg oral Tablet, Extended Release 24 hr [TAKE ONE-HALF (1/2) TABLET DAILY]    Vitamin C 250 mg oral tablet [1 tab TID with iron supplement]    Uloric 40 mg oral tablet [TAKE 1 TABLET DAILY]    Blood Glucose Test strips  [Check blood sugars twice daily.  "Dispense brand covered by insurance. E11.9]    Pen Needle 31 gauge x 1/4\"  [use as directed ]    famotidine 20 mg oral tablet [take 1 tablet (20 mg) by oral route  every other  day]    furosemide 80 mg oral tablet [take 1/2  tablet (80 mg) by oral route 2 times per day]        Objective:        Vitals:         Current: 9/29/2020 10:03:17 AM    Ht:  5 ft, 10 in;  Wt: 217 lbs;  BMI: 31.1T: 96.1 F (temporal);  BP: 105/32 mm Hg (right arm, sitting);  P: 74 bpm (right arm (BP Cuff), sitting);  sCr: 4.35 mg/dL;  GFR: 13.44        Repeat:     10:3:32 AM  BP:   107/30mm Hg (left arm, sitting, HR: 70)     Exams:     PHYSICAL EXAM:     GENERAL: Vitals recorded well developed, well nourished,  mildly obese;  well groomed;  no apparent distress, tired-appearing;     EYES: extraocular movements intact; conjunctiva and cornea are normal; PERRLA;     E/N/T: OROPHARYNX:  normal mucosa, dentition, gingiva, and posterior pharynx;     NECK: range of motion is normal; trachea is midline;     RESPIRATORY: normal respiratory rate and pattern with no distress; rales (\"crackles\") present in the bases;     CARDIOVASCULAR: normal rate; rhythm is regular;  no systolic murmur;     GASTROINTESTINAL: nontender;     MUSCULOSKELETAL: gait: uses a cane;  spine: kyphosis;     NEUROLOGIC: mental status: alert and oriented x 3; GROSSLY INTACT     PSYCHIATRIC:  appropriate affect and demeanor; normal speech pattern; grossly normal memory;         Assessment:         Z13.31   Encounter for screening for depression       I10   Essential (primary) hypertension       E11.9   Type 2 diabetes mellitus without complications       N18.5   Chronic kidney disease, stage 5       I50.42   Chronic combined systolic (congestive) and diastolic (congestive) heart failure       D63.1   Anemia in chronic kidney disease       M54.5   Low back pain           ORDERS:         Lab Orders:       FUTURE  Future order to be done at patients convenience  (Send-Out)            " 66590  BDCBC - [facility] CBC with 3 part diff  (Send-Out)            91773  COMP - [facility] Comp. Metabolic Panel  (Send-Out)            62714  A1CEG - [facility] Hemoglobin A1C  (Send-Out)            88364  TSH - [facility] TSH  (Send-Out)            FUTURE  Future order to be done at patients convenience  (Send-Out)            18138  BRNAP - [facility] B-Type Natriurectic peptide  (Send-Out)            FUTURE  Future order to be done at patients convenience  (Send-Out)            95972  FERR - [facility] Ferritin Serum  (Send-Out)            82677  FOL - [facility] Folate; folic acid serum  (Send-Out)            57911  RETEC - [facility] Reticulocyte count  (Send-Out)            19766  IRONP - [facility] Iron and TIBC  (Send-Out)            46129  VB12 - [facility] Vitamin B12  (Send-Out)              Other Orders:         Depression screen negative  (In-House)                      Plan:         Encounter for screening for depression    MIPS PHQ-9 Depression Screening: Completed form scanned and in chart; Total Score 2; Negative Depression Screen           Orders:         Depression screen negative  (In-House)              Essential (primary) hypertensionPt advised to f/u with nephrology about BP. Cont metoprolol 25 mg 1/2 tab qd as faster HR may affect ventricular filling time. Cont lasix 80 mg 1/2 tab BID per nephrology.        Type 2 diabetes mellitus without complicationsPt advised that a glucose of 200 or less for brief periods are not a problem and his blood sugar has overall improved. If repeat A1c is < 5.7 we can consider eliminating lantus and decreasing fingersticks.         FOLLOW-UP TESTING #1: FOLLOW-UP LABORATORY:  Labs to be scheduled in the future include CBC, CMP, HgbA1C, and TSH.            Orders:       FUTURE  Future order to be done at patients convenience  (Send-Out)            60053  BDCBC - [facility] CBC with 3 part diff  (Send-Out)            55010  COMP - [facility] Comp. Metabolic Panel  (Send-Out)            72455  A1CEG - [facility] Hemoglobin A1C  (Send-Out)             72191  TSH - [facility] TSH  (Send-Out)              Chronic kidney disease, stage 5Cont dialysis MWF at [facility] here in [place] and f/u with nephrology.        Chronic combined systolic (congestive) and diastolic (congestive) heart failureEpisode of severe fatigue and lightheadedness may be due to anemia, hypotension, heart failure, hypoglycemia, or ESRD. Will check labs today and advise accordingly. f/u with cardiology.         FOLLOW-UP TESTING #1: FOLLOW-UP LABORATORY:  Labs to be scheduled in the future include BNP.            Orders:       FUTURE  Future order to be done at patients convenience  (Send-Out)            69107  BRNAP - [facility] B-Type Natriurectic peptide  (Send-Out)              Anemia in chronic kidney diseaseAs above, anemia may be contributing to above mentioned lightheadedness. Labs ordered to assess.         Low back painPt advised he can increase gabapentin 300 mg from qHS to BID if he wishes.             Other Orders      FUTURE  Future order to be done at patients convenience  (Send-Out)            00444  FERR - [facility] Ferritin Serum  (Send-Out)            90253  FOL - [facility] Folate; folic acid serum  (Send-Out)            88805  RETEC - [facility] Reticulocyte count  (Send-Out)            62461  IRONP - [facility] Iron and TIBC  (Send-Out)            31038  VB12 - [facility] Vitamin B12  (Send-Out)              Patient Recommendations:        For  Type 2 diabetes mellitus without complications:            The following laboratory testing has been ordered: CBC metabolic panel, comprehensive HgbA1C TSH         For  Chronic combined systolic (congestive) and diastolic (congestive) heart failure:            The following laboratory testing has been ordered:             Charge Capture:         Primary Diagnosis:     Z13.31  Encounter for screening for depression           Orders:      25747  Office/outpatient visit; established patient, level 4  (In-House)              Depression screen negative  (In-House)               I10  Essential (primary) hypertension     E11.9  Type 2 diabetes mellitus without complications     N18.5  Chronic kidney disease, stage 5     I50.42  Chronic combined systolic (congestive) and diastolic (congestive) heart failure     D63.1  Anemia in chronic kidney disease     M54.5  Low back pain

## 2021-05-18 NOTE — PROGRESS NOTES
Fidel Cleary  1935     Office/Outpatient Visit    Visit Date: Thu, Dec 26, 2019 03:28 pm    Provider: Rocco Haskins MD (Assistant: Maribel Rene MA)    Location: [facility]        Electronically signed by Rocco Haskins MD on  12/26/2019 09:12:59 PM                             Subjective:        CC: Mr. Cleary is a 84 year old White male.  He is here today following a transition of care from an inpatient hospital: [facility]. The patient was admitted on 12/17/2019. The patient was admitted for UTI. Our office called the patient within 48 hours of discharge and scheduled the follow-up appointment. During the patient's hospital stay the patient was treated by Dr. Hankins. Medications have been reviewed and reconciled with discharge summary..          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 Pt was treated with broad spectrum IV antibiotics during hospitalization and was discharged with 14 days of levaquin 500 mg qd. He has completed 7 of these days so far. He denies dysuria or hematuria since admission. Pt was seen by Dr. Huston initially during admission and then by Dr. Tolentino. Pt will f/u as OP with Dr. Tolentino on 1/2 for OP cystoscopy. Dr. Tolentino (and heme/onc ) have indicated pt is too ill at this time to have a wedge nephrectomy or radical nephrectomy which would result in him needing dialysis.      Pt had 2 units of RBCs on 12/13/19 with . At that visit  indicated pt is no longer a candidate for iron infusions given his reaction of chest pain on iron infusion. Pt will not receive palliative RBC infusions for symptomatic anemia. Pt will cont ferrous sulfate and vitamin C TID.       As above, pt was admitted to [facility] from 12/17 to 12/19 for hematuria 2/2 Klebsiella UTI and RCC.       Pt is on lasix 40 mg BID for at  "least the last month or longer. BLE edema has worsened over the last 3 days. Breathing is \"off and on pretty good.\" Walking distance is OK but it depends on the day. He is prescribed 5L continuously but he only wears 2L during the day and 5L at night. Pt saw Dr. Amaya on 10/1/19 and no significant changes were made: Cont beta blocker (metoprolol 12.5 mg qd) and statin. ASA held 2/2 GI bleed. Most recent BNP was 12,500 on 11/13/19. He is not sure when his next apt with Dr. Amaya is.    ROS:     CONSTITUTIONAL:  Positive for fatigue ( severe ).   Negative for fever.      EYES:  Negative for blurred vision.      E/N/T:  Negative for diminished hearing and nasal congestion.      CARDIOVASCULAR:  Negative for chest pain, orthopnea ( resolved with lasix ) and palpitations.      RESPIRATORY:  Positive for dyspnea ( with moderate exertion; with mild exertion ).   Negative for recent cough.      GASTROINTESTINAL:  Positive for heartburn ( better with zantac ) and melena ( he is on iron tabs ).   Negative for abdominal pain, constipation, diarrhea, hematochezia, nausea or vomiting.      GENITOURINARY:  Negative for dysuria.      MUSCULOSKELETAL:  Positive for arthralgias and (diffuse) back pain.   Negative for myalgias.      INTEGUMENTARY:  Negative for atypical mole(s) and rash.      NEUROLOGICAL:  Negative for paresthesias and weakness.      PSYCHIATRIC:  Negative for anxiety, depression and sleep disturbance.          Past Medical History / Family History / Social History:         Last Reviewed on 12/26/2019 09:04 PM by Rocco Haskins    Past Medical History:             PAST MEDICAL HISTORY         Congestive Heart Failure: dx'd in ;     Coronary Artery Disease: dx'd in 2003;     Chronic Renal Failure: dx'd in 4-2017;     diabetic ucler left toe 12-22-17         Surgical History:         Cataract Removal: 2010;     left toe amputated 3-18-18;     Other Surgeries:    lipoma right arm [id number];    CABG June " ;    penile implant 2006;    Bladder cancer surgery ;     Procedures: cardiac stents 2003 vascular surgery left leg 2018 and Candi          Family History:     Father:  at age 78; Cause of death was heart related     Mother:  at age 89     Brother(s): 4 brother(s) total; 4 ;  Arrhythmia;  COPD;  Parkinson's Disease;  MVA age 19     Sister(s): 1 sister(s) total; 1 ;  heart related     Son(s): 1 son(s) total; 1 ;  Leukemia (  age 38 )     Daughter(s): Healthy; 1 daughter(s) total         Social History:     Occupation: Retired (Prior occupation: [employer])     Marital Status: / /remarried     Children: 2 children         Tobacco/Alcohol/Supplements:     Last Reviewed on 2019 09:04 PM by Rocco Haskins    Tobacco: He has a past history of cigarette smoking; quit date:  .          Substance Abuse History:     Last Reviewed on 2019 09:04 PM by Rocco Haskins        Mental Health History:     Last Reviewed on 2019 09:04 PM by Rocco Haskins        Communicable Diseases (eg STDs):     Last Reviewed on 2019 09:04 PM by Rocco Haskins        Current Problems:     Last Reviewed on 2019 09:04 PM by Rocco Haskins    Hypothyroidism, unspecified    Type 2 diabetes mellitus without complications    Other iron deficiency anemias    Peripheral vascular disease, unspecified    Atherosclerotic heart disease of native coronary artery without angina pectoris    Heartburn    Chronic combined systolic (congestive) and diastolic (congestive) heart failure    Inflammatory polyarthropathy    Gout, unspecified    Hyperlipidemia, unspecified    Low back pain    Chronic kidney disease, stage 3 (moderate)    Neoplasm of uncertain behavior of left kidney    Anemia, unspecified    Obstructive sleep apnea (adult) (pediatric)    Essential (primary) hypertension    Encounter for immunization    Encounter for follow-up  examination after completed treatment for conditions other than malignant neoplasm    Urinary tract infection, site not specified        Immunizations:     pneumococcal polysaccharide PPV23 (PNEUMOVAX 23) 11/13/2019    Fluzone High-Dose pf (>=65 yr) 11/1/2018    Influenza, high dose seasonal 12/26/2019        Allergies:     Last Reviewed on 12/26/2019 09:04 PM by Rocco Haskins    Naprosyn:      mold:      Dust mites:          Current Medications:     Last Reviewed on 12/26/2019 09:04 PM by Rocco Haskins    metoprolol succinate 25 mg oral Tablet, Extended Release 24 hr [Take 1/2 tablet daily]    Bone D3 Immune 4000mg tablet po daily @ 12n     Multivitamin tablet po daily      Fish Oil      tamsulosin 0.4 mg oral capsule [1 tab daily]    Nitrostat 0.4 mg Sublingual Tablet, Sublingual [as directed]    Synthroid 0.175mg Tablet [Take 1 tablet(s) by mouth daily]    Finasteride 5 mg oral tablet [Take 1 tablet(s) by mouth daily for prostate]    furosemide 40 mg oral tablet [1 tablet po tid]    gabapentin 300 mg oral capsule [1 tablet po qhs]    levothyroxine 175 mcg oral tablet [TAKE 1 TABLET DAILY]    Lantus U-100 Insulin 100 unit/mL subcutaneous Solution [4 units BID]    Uloric 40 mg oral tablet [TAKE 1 TABLET DAILY]    MagOx 400 mg (241.3 mg magnesium) oral tablet [1 po qd]    Blood Glucose Test strips  [Check blood sugars twice daily. Dispense brand covered by insurance. E11.9]    Accu-Chek Multiclix Lancets  Lancet [Check blood sugar BID, Dx: E11.9]    lisinopril 10 mg oral tablet [take 1 tablet (10 mg) by oral route once daily]        Objective:        Vitals:         Current: 12/26/2019 3:34:56 PM    Ht:  5 ft, 10 in;  Wt: 237.6 lbs;  BMI: 34.1T: 97.8 F (oral);  BP: 133/78 mm Hg (right arm, sitting);  P: 106 bpm (right arm (BP Cuff), sitting);  sCr: 1.73 mg/dL;  GFR: 35.72        Exams:     PHYSICAL EXAM:     GENERAL: Vitals recorded well developed, well nourished,  mildly obese;  well groomed;  no apparent  "distress;     EYES: extraocular movements intact; conjunctiva and cornea are normal; PERRLA;     E/N/T: OROPHARYNX:  normal mucosa, dentition, gingiva, and posterior pharynx;     NECK: range of motion is normal; trachea is midline; thyroid is non-palpable;     RESPIRATORY: normal respiratory rate and pattern with no distress; rales (\"crackles\") present in the bases;     CARDIOVASCULAR: mildly tachycardic;  rhythm is regular;  no systolic murmur; 3+ pedal and pitting to just below knees bilaterally edema;     GASTROINTESTINAL: nontender; slightly distended; normal bowel sounds;     MUSCULOSKELETAL: gait: uses a walker;  normal overall tone     NEUROLOGIC: mental status: alert and oriented x 3; GROSSLY INTACT     PSYCHIATRIC:  appropriate affect and demeanor; normal speech pattern; grossly normal memory;         Assessment:         D41.02   Neoplasm of uncertain behavior of left kidney       D50.8   Other iron deficiency anemias       N39.0   Urinary tract infection, site not specified       I50.42   Chronic combined systolic (congestive) and diastolic (congestive) heart failure           ORDERS:         Meds Prescribed:       [Refilled] furosemide 40 mg oral tablet [1 tablet po tid ], #270 (two hundred and seventy) tablets, Refills: 0 (zero)         Lab Orders:       APPTO  Appointment need  (In-House)                      Plan: Pt has several very serious co-morbidities complicating his care. Pt is hoping to have his RCC resolved with wedge nephrectomy but he is too ill to undergo this procedure and I do not see his overall situation improving. I have tried to address his overall poor medical condition with him and his wife but I do not want to diminish their hope either. Pt's heart failure appears worse in terms of edema and lung crackles, his iron deficiency is worse in terms of him no longer being a candidate for iron infusions and his RCC is worse in terms of him being too-ill to have surgery to have this " "removed. I think hospice should be discussed in 1 month after patient has again seen his specialists and all avenues have been explored.         Neoplasm of uncertain behavior of left kidneyPt will f/u with Dr. Tolentino on 1/2/20. He is currently in too poor of health to have a wedge nephrectomy.         Other iron deficiency anemiasPt will f/u with  in mid-January. He unfortunately cannot have IV iron transfusions given his reaction to most recent transfusion. Pt declines labs today, citing he feels \"like a pin cushion.\" This is reasonable and labs may be ordered soon at specialist appointments that are coming in the next couple of weeks.        Urinary tract infection, site not specifiedPt was hospitalized for a life-threatening UTI as this had the potential to progress to sepsis, particularly given patient overall poor health and several serious co-morbidities. Pt will complete 7 more days of levaquin 500 mg qd for Klebsiella pna UTI.        Chronic combined systolic (congestive) and diastolic (congestive) heart failureLasix increased from 40 mg BID to TID given BLE 3+ pitting edema to just below the knees.        FOLLOW-UP: Schedule a follow-up visit in 1 month.:.            Prescriptions:       [Refilled] furosemide 40 mg oral tablet [1 tablet po tid ], #270 (two hundred and seventy) tablets, Refills: 0 (zero)           Orders:       APPTO  Appointment need  (In-House)                  Patient Recommendations:        For  Chronic combined systolic (congestive) and diastolic (congestive) heart failure:    Schedule a follow-up visit in 1 month.                APPOINTMENT INFORMATION:        Monday Tuesday Wednesday Thursday Friday Saturday Sunday            Time:___________________AM  PM   Date:_____________________             Charge Capture:         Primary Diagnosis:     D41.02  Neoplasm of uncertain behavior of left kidney           Orders:      60005  Transitional care manage service 14 day " discharge  (In-House)              D50.8  Other iron deficiency anemias     N39.0  Urinary tract infection, site not specified     I50.42  Chronic combined systolic (congestive) and diastolic (congestive) heart failure           Orders:      APPTO  Appointment need  (In-House)

## 2021-05-18 NOTE — PROGRESS NOTES
Fidel Cleary  1935     Office/Outpatient Visit    Visit Date: Wed, Nov 13, 2019 11:23 am    Provider: Rocco Haskins MD (Assistant: Angela Shea LPN)    Location: [facility]        Electronically signed by Rocco Haskins MD on  11/13/2019 07:27:34 PM                             Subjective:        CC: Mr. Cleary is a 84 year old White male.  This is a follow-up visit.          HPI: 84-year-old  male w/CAD status post multiple stents and CABG in 2003, CKD stage III, diabetes type 2, diastolic heart failure, and suspected renal cell carcinoma.      BP today is 160/74 with a HR of 82. He is on metoprolol 25 mg 1/2 tab qd.      Pt saw  on 10/28/19 for oncology w/u, anemia w/u and surgical clearance for possible left wedge nephrectomy. He has a 3.5 x 3.7 left renal mass concerning for RCC, wedge nephrectomy planned by urologist Dr. Tolentino, profound macrocytic anemia requiring multiple transfusions. CKD III/IV, COPD oxygen dependent, vitamin D deficiency. Pt had MRI abdomen on 8/21/19 (prior to hospitalization) showing solid appearing left renal mass, with no increase in size on CT Ab/pelvis on  10/17/19. Last visit with urologist Kinjal Tolentino was in August, no apts scheduled until he completes 3 iron infusions.      A1c was 5.8 on labs 7/17/19 and was 5.2 on 10/15/19. He is on lantus 8 units BID and no other DM 2 medications. He checks his glucose BID and its typically 120 in the morning and at night it 130, might be has high as 160 at night. He used to have very low AM readings. Last time we discussed diet and they are eliminating cinnamon toast crunch. He is eating a little less fruit.      Pt has restarted lasix 40 mg BID according to Dr. Fontana and in agreement with nephrology and cardiology. This is based on his weight but he's taken it BID for 3 weeks. Breathing and edema has improved. Walking distance has improved some with lasix. He is prescribed 5L  continuously but he only wears 2L during the day and 5L at night. He was recently admitted to [facility] from 9/3 to 9/13 for hypoxemic respiratory failure 2/2 decompensated combined type heart failure, STARLA on CKD, and profound anemia. Pt saw Dr. Amaya on 10/1/19 and no significant changes were made: Cont beta blocker (metoprolol 12.5 mg qd) and statin. ASA held 2/2 GI bleed. Diuretics on hold 2/2 CKD.      Kidney function recently in decline, with baseline Cr between 2-3. During hospitalization Cr peaked at 4.0. Cr seems to have leveled off, was 2.36 after our last visit and 1.6 on labs yesterday. Pt is following closely with Dr. Dumont. No change in urine output.       Pt saw  on 10/28/19 for oncology w/u, macrocytic anemia w/u, and surgical clearance for possible left wedge nephrectomy. Pt has profound macrocytic anemia requiring multiple transfusions. He saw him again 11/8/19 and pt will start iron infusions tomorrow. Most recent labs here was 1 month ago. He denies dizziness or lightheadedness.    ROS:     CONSTITUTIONAL:  Positive for fatigue ( severe ).   Negative for fever.      EYES:  Negative for blurred vision.      E/N/T:  Negative for diminished hearing and nasal congestion.      CARDIOVASCULAR:  Negative for chest pain, orthopnea ( resolved with lasix ) and palpitations.      RESPIRATORY:  Positive for dyspnea ( with moderate exertion; with mild exertion ).   Negative for recent cough.      GASTROINTESTINAL:  Positive for heartburn ( better with zantac ) and melena ( he is on iron tabs ).   Negative for abdominal pain, constipation, diarrhea, hematochezia, nausea or vomiting.      GENITOURINARY:  Negative for dysuria.      MUSCULOSKELETAL:  Positive for arthralgias and (diffuse) back pain.   Negative for myalgias.      INTEGUMENTARY:  Negative for atypical mole(s) and rash.      NEUROLOGICAL:  Negative for paresthesias and weakness.      PSYCHIATRIC:  Negative for anxiety, depression and sleep  disturbance.          Past Medical History / Family History / Social History:         Last Reviewed on 2019 07:22 PM by Rocco Haskins    Past Medical History:             PAST MEDICAL HISTORY         Congestive Heart Failure: dx'd in ;     Coronary Artery Disease: dx'd in ;     Chronic Renal Failure: dx'd in ;     diabetic ucler left toe 17         Surgical History:         Cataract Removal: ;     left toe amputated 3-18-18;     Other Surgeries:    lipoma right arm [id number];    CABG 2004;    penile implant 2006;    Bladder cancer surgery ;     Procedures: cardiac stents 2003 vascular surgery left leg 2018 and Candi          Family History:     Father:  at age 78; Cause of death was heart related     Mother:  at age 89     Brother(s): 4 brother(s) total; 4 ;  Arrhythmia;  COPD;  Parkinson's Disease;  MVA age 19     Sister(s): 1 sister(s) total; 1 ;  heart related     Son(s): 1 son(s) total; 1 ;  Leukemia (  age 38 )     Daughter(s): Healthy; 1 daughter(s) total         Social History:     Occupation: Retired (Prior occupation: [employer])     Marital Status: / /remarried     Children: 2 children         Tobacco/Alcohol/Supplements:     Last Reviewed on 2019 07:22 PM by Rocco Haskins    Tobacco: He has a past history of cigarette smoking; quit date:  .          Substance Abuse History:     Last Reviewed on 2019 07:22 PM by Rocco Haskins        Mental Health History:     Last Reviewed on 2019 07:22 PM by Rocco Haskins        Communicable Diseases (eg STDs):     Last Reviewed on 2019 07:22 PM by Rocco Haskins        Current Problems:     Last Reviewed on 2019 07:22 PM by Rocco Haskins    Other specified iron deficiency anemia    Acquired hypothyroidism, other specified cause    Other iron deficiency anemias    Hypothyroidism, unspecified    Type 2  diabetes mellitus without complications    Peripheral vascular disease, unspecified    Atherosclerotic heart disease of native coronary artery without angina pectoris    Type 2 diabetes    Coronary artery disease    Unspecified PVD    GERD    Hyperlipidemia    Unspecified inflammatory polyarthritis    Chronic combined systolic (congestive) and diastolic (congestive) heart failure    Heartburn    Inflammatory polyarthropathy    Gout, unspecified    Hyperlipidemia, unspecified    Gout, unspecified    Combined systolic and diastolic heart failure, chronic    Chronic kidney disease, Stage III (moderate)    Low back pain    Chronic kidney disease, stage 3 (moderate)    Chronic low back pain    Encounter for follow-up examination after completed treatment for conditions other than malignant neoplasm    Follow-up examination    Neoplasm of uncertain behavior of left kidney    Anemia, unspecified    Profound anemia    Renal mass    BLAS    Benign HTN    Obstructive sleep apnea (adult) (pediatric)    Essential (primary) hypertension    Encounter for immunization    Use of high risk medications        Immunizations:     Fluzone High-Dose pf (>=65 yr) 11/1/2018        Allergies:     Last Reviewed on 11/13/2019 07:22 PM by Rocco Haskins    Naprosyn:      mold:      Dust mites:          Current Medications:     Last Reviewed on 11/13/2019 07:22 PM by Rocco Haskins    metoprolol succinate 25 mg oral Tablet, Extended Release 24 hr [Take 1/2 tablet daily]    Bone D3 Immune 4000mg tablet po daily @ 12n     Multivitamin tablet po daily      Fish Oil      tamsulosin 0.4 mg oral capsule [1 tab daily]    Nitrostat 0.4 mg Sublingual Tablet, Sublingual [as directed]    Synthroid 0.175mg Tablet [Take 1 tablet(s) by mouth daily]    Finasteride 5 mg oral tablet [Take 1 tablet(s) by mouth daily for prostate]    Gabapentin 300 mg oral capsule [1 tablet po qhs]    Uloric 40mg Tablet [Take 1 tablet(s) by mouth daily]    Ferrous Sulfate 325  "mg (65 mg iron) oral tablet [Take 1 tablet(s) by mouth tid]    Vitamin C 250mg Tablets [1 tab TID with iron supplement]    MagOx 400 mg (241.3 mg magnesium) oral tablet [1 po qd]    Glucose Reagent Blood Test Strips  Reagent Strips [Check blood sugars twice daily. Dispense brand covered by insurance. E11.9]    Accu-Chek Multiclix Lancets  Lancet [Check blood sugar BID, Dx: E11.9]    Lantus U-100 Insulin 100 unit/mL subcutaneous Solution [4 units BID]        Objective:        Vitals:         Current: 11/13/2019 11:32:36 AM    Ht:  5 ft, 10 in;  Wt: 230.2 lbs;  BMI: 33.0T: 97.4 F;  BP: 160/74 mm Hg (right arm, sitting);  P: 82 bpm (right arm (BP Cuff), sitting);  sCr: 1.6 mg/dL;  GFR: 38.11        Exams:     PHYSICAL EXAM:     GENERAL: Vitals recorded well developed, well nourished,  mildly obese;  well groomed;  no apparent distress;     EYES: extraocular movements intact; conjunctiva and cornea are normal; PERRLA;     E/N/T: OROPHARYNX:  normal mucosa, dentition, gingiva, and posterior pharynx;     NECK: range of motion is normal; trachea is midline; thyroid is non-palpable;     RESPIRATORY: normal respiratory rate and pattern with no distress; rales (\"crackles\") present in the RLL;     CARDIOVASCULAR: normal rate; rhythm is regular;  no systolic murmur; 2+ pedal and pitting to mid-shins edema;     GASTROINTESTINAL: nontender; normal bowel sounds;     MUSCULOSKELETAL: gait: uses a walker;  normal overall tone     NEUROLOGIC: mental status: alert and oriented x 3; GROSSLY INTACT     PSYCHIATRIC:  appropriate affect and demeanor; normal speech pattern; grossly normal memory;         Lab/Test Results:         Urine temperature: confirmed (11/13/2019),     All urine drug screen levels confirmed negative: yes (11/13/2019),     Date and time of last pill: gabapentin 11/12/19 7 pm/ad (11/13/2019),     Performed by: tls (11/13/2019),     Collection Time: 1140 (11/13/2019),             Assessment:         V58.69   Use of " high risk medications   (Mild)     I10   Essential (primary) hypertension       D41.02   Neoplasm of uncertain behavior of left kidney       E11.9   Type 2 diabetes mellitus without complications       I50.42   Chronic combined systolic (congestive) and diastolic (congestive) heart failure       N18.3   Chronic kidney disease, stage 3 (moderate)       D64.9   Anemia, unspecified       Z23   Encounter for immunization           ORDERS:         Meds Prescribed:       [New Rx] lisinopril 10 mg oral tablet [take 1 tablet (10 mg) by oral route once daily], #90 (ninety) tablets, Refills: 2 (two)         Lab Orders:       [id number]  Drug test prsmv read direct optical obs pr date  (In-House)            01971  BDCBC - [facility] CBC with 3 part diff  (Send-Out)            12426  COMP - [facility] Comp. Metabolic Panel  (Send-Out)            83294  TSH - [facility] TSH  (Send-Out)            53323  NTBNP - [facility] B-Type Natriurectic peptide  (Send-Out)            24150  IRONP - [facility] Iron and TIBC  (Send-Out)            APPTO  Appointment need  (In-House)              Procedures Ordered:       53043  PNEUMOVAX 23  (In-House)              Other Orders:         Administration of pneumococcal vaccine  (x1)                  Plan:         Use of high risk medications          Orders:       12464  Drug test prsmv read direct optical obs pr date  (In-House)              Essential (primary) hypertensionBP elevated today, lisinopril 10 mg qd is added to metoprolol 25 mg 1/2 tab qd.        FOLLOW-UP: Schedule a follow-up appointment in 5 weeks.:.            Prescriptions:       [New Rx] lisinopril 10 mg oral tablet [take 1 tablet (10 mg) by oral route once daily], #90 (ninety) tablets, Refills: 2 (two)           Orders:       APPTO  Appointment need  (In-House)              Neoplasm of uncertain behavior of left kidneyPt has clearance from cardiology, pulmonology, and is completing clearance from oncology. Hopefully left wedge nephrectomy can happen soon.          Type 2 diabetes mellitus without [id number] has been in prediabetic or normal range. Reduce lantus to 4 units BID.    LABORATORY:  Labs ordered to be performed today include CBC, Comprehensive metabolic panel, and TSH.            Orders:       28614  BDCBC - [facility] CBC with 3 part diff  (Send-Out)            68385  COMP - [facility] Comp. Metabolic Panel  (Send-Out)            37200  TSH - [facility] TSH  (Send-Out)              Chronic combined systolic (congestive) and diastolic (congestive) heart failureWill check BNP. Heart failure improved with re-starting lasix.     LABORATORY:  Labs ordered to be performed today include BNP.            Orders:       41481  NTBNP - [facility] B-Type Natriurectic peptide  (Send-Out)              Anemia, unspecifiedPt will start iron infusions tomorrow. f/u with heme/onc.     LABORATORY:  Labs ordered to be performed today include Anemia profile Serum iron.            Orders:       36620  IRONP - [facility] Iron and TIBC  (Send-Out)              Encounter for immunization          Immunizations:       23062  PNEUMOVAX 23  (In-House)                Dose (ml): 0.5  Site: right deltoid  Route: intramuscular  Administered by: Barb Neff          : [employer]  Lot #: [id number]  Exp: 11/28/2020          NDC: [address]-51        Administration of pneumococcal vaccine  (x1)              Patient Recommendations:        For  Essential (primary) hypertension:    Schedule a follow-up visit in 5 weeks.                APPOINTMENT INFORMATION:        Monday Tuesday Wednesday Thursday Friday Saturday Sunday            Time:___________________AM  PM   Date:_____________________             Charge Capture:         Primary Diagnosis:     V58.69  Use of high risk medications           Orders:      05830  Office/outpatient visit; established patient, level 5  (In-House)            35629  Drug test prsmv read direct optical obs pr date  (In-House)              I10  Essential (primary)  hypertension           Orders:      APPTO  Appointment need  (In-House)              D41.02  Neoplasm of uncertain behavior of left kidney     E11.9  Type 2 diabetes mellitus without complications     I50.42  Chronic combined systolic (congestive) and diastolic (congestive) heart failure     N18.3  Chronic kidney disease, stage 3 (moderate)     D64.9  Anemia, unspecified     Z23  Encounter for immunization           Orders:      05677  PNEUMOVAX 23  (In-House)              Administration of pneumococcal vaccine  (x1)

## 2021-05-18 NOTE — PROGRESS NOTES
Fidel Cleary 1935     Office/Outpatient Visit    Visit Date: Tue, Jul 23, 2019 11:11 am    Provider: Rocco Haskins MD (Assistant: Jenny Byers LPN)    Location: [facility]        Electronically signed by Rocco Haskins MD on  07/23/2019 07:23:35 PM                             SUBJECTIVE:        CC: pt is no longer taking Feosol OT. takes Ferrous Sulfate- thats prescription.     Lantus pt states he is taking 25 units in the am and pm, NOT 50.         Mr. Cleary is a 84 year old White male.  pt states he believes he is here today to discuss his recent lab work results.          HPI:     Cr 1.93 on labs recently. Pt reports he was seeing a nephrologist and urologist in [place], [place]. He has been told CKD is either stage III or IV. He has no change in urine output.     BNP recently was over 5,000. Pt had ECHO earlier this year. He was hospitalized in October 2018 for CHF exacerbation. He reports losing 7lbs in the last 2 weeks. His most exertion comes from walking in the store and does not get very short of breath with doing this. He does not have much swelling now but can get edema in right leg if walking a lot, especially on hard surfaces.     A1c was 5.8 recently. He is on lantus 25 units BID. He checks his glucose BID and its typically 80 in the morning and at night it varies a lot, as high as 200 and as low as 50. He will eat cuties/tangerines and apples. He often needs to eat fruit in the morning 2/2 hypoglycemia. He eats dry cereal (cinnamon crunch).     Pt was hospitalized 9 months ago for bleeding gastric ulcer and heart failure exacerbation. He reports there is no blood or melena in stool. hgb low on labs recently (9.5).     Chronic low back pain. He describes it as all the way across just above buttocks. Stabbing pain, makes him bend over. It can be 10/10 at its worst and is typically 6/10. He did some PT for this in [place]. This is worse with movement and better as the day  goes on.     ROS:     CONSTITUTIONAL:  Positive for fatigue.   Negative for fever.      EYES:  Negative for blurred vision.      E/N/T:  Negative for diminished hearing and nasal congestion.      CARDIOVASCULAR:  Negative for chest pain and palpitations.      RESPIRATORY:  Positive for dyspnea ( with moderate exertion ).   Negative for recent cough.      GASTROINTESTINAL:  Positive for heartburn ( better with zantac ).   Negative for abdominal pain, constipation, diarrhea, hematochezia, melena, nausea or vomiting.      GENITOURINARY:  Negative for dysuria.      MUSCULOSKELETAL:  Positive for arthralgias and (diffuse) back pain.   Negative for myalgias.      INTEGUMENTARY:  Negative for atypical mole(s) and rash.      NEUROLOGICAL:  Negative for paresthesias and weakness.      PSYCHIATRIC:  Negative for anxiety, depression and sleep disturbance.          PMH/FMH/SH:     Last Reviewed on 2019 07:15 PM by Rocco Haskins    Past Medical History:             PAST MEDICAL HISTORY         Congestive Heart Failure: dx'd in -;     Coronary Artery Disease: dx'd in ;     Chronic Renal Failure: dx'd in ;     diabetic ucler left toe 17         Surgical History:         Cataract Removal: ;      left toe amputated 3-18-18;     Other Surgeries:    lipoma right arm 0437-5834;    CABG 2004;    penile implant 2006;    Bladder cancer surgery ;     Procedures: cardiac stents 2003 vascular surgery left leg 2018 and [facility]          Family History:     Father:  at age 78; Cause of death was heart related     Mother:  at age 89     Brother(s): 4 brother(s) total; 4 ;  Arrhythmia;  COPD;  Parkinson's Disease;  MVA age 19     Sister(s): 1 sister(s) total; 1 ;  heart related     Son(s): 1 son(s) total; 1 ;  Leukemia (  age 38 )     Daughter(s): Healthy; 1 daughter(s) total         Social History:     Occupation: Retired (Prior occupation:  [employer] )     Marital Status: / /remarried     Children: 2 children         Tobacco/Alcohol/Supplements:     Last Reviewed on 7/23/2019 07:15 PM by Rocco Haskins    Tobacco: He has a past history of cigarette smoking; quit date:  1990.          Substance Abuse History:     Last Reviewed on 7/23/2019 07:15 PM by Rocco Haskins        Mental Health History:     Last Reviewed on 7/23/2019 07:15 PM by Rocco Haskins        Communicable Diseases (eg STDs):     Last Reviewed on 7/23/2019 07:15 PM by Rocco Haskins            Current Problems:     Last Reviewed on 7/23/2019 07:15 PM by Rocco Haskins    Chronic low back pain     Anemia, unspecified     Chronic kidney disease, Stage III (moderate)     Combined systolic and diastolic heart failure, chronic     GERD     Unspecified inflammatory polyarthritis     Gout, unspecified     Hyperlipidemia     Coronary artery disease     Congestive heart failure     Unspecified PVD     Acquired hypothyroidism, other specified cause     Type 2 diabetes     Other specified iron deficiency anemia         Immunizations:     Fluzone High-Dose pf (>=65 yr) 11/1/2018         Allergies:     Last Reviewed on 7/23/2019 07:15 PM by Rocco Haskins    Naprosyn:    mold:    Dust mites:        Current Medications:     Last Reviewed on 7/23/2019 07:15 PM by Rocco Haskins    Finasteride 5mg Tablet Take 1 tablet(s) by mouth daily for prostate     Synthroid 0.175mg Tablet Take 1 tablet(s) by mouth daily     Atorvastatin Calcium 40mg Tablet 1 tab daily     Ferrous Sulfate 325mg Tablets 1 tab bid with food     Ranitidine 150mg Tablet 1 tab bid     Uloric 40mg Tablet Take 1 tablet(s) by mouth daily     Vitamin C 250mg Tablets 1 tab BID with iron supplement     Aspirin (ASA) 81mg Tablets, Enteric Coated 1 tab daily     B12 Energy 1000mcg daily     Bone D3 Immune 4000mg tablet po daily     Feosol 350mg tablet po every morning     Fish Oil     Furosemide 40mg Tablets 1  tablet po tid     Gabapentin 300mg Capsules 1 tablet po qhs     Hydroxyzine HCl 25mg Tablet TID     Lantus 100units/1ml Injection 12 units bid     Metoprolol 50mg Tablet 1 tab bid     Midrin - PRN     Multivitamin tablet po daily     Nitrostat 0.4mg Tablets, Sublingual as directed     Pantoprazole 40mg Tablets, Delayed Release Take 1 tablet(s) by mouth bid     Tamsulosin HCl 0.4mg Capsules 1 tab daily     Vitamin C 1000mg tablet po daily         OBJECTIVE:        Vitals:         Current: 7/23/2019 11:22:18 AM    Ht:  5 ft, 10 in;  Wt: 219.8 lbs;  BMI: 31.5    T: 97 F;  BP: 118/76 mm Hg (right arm, sitting);  P: 71 bpm (right arm (BP Cuff), sitting);  sCr: 1.93 mg/dL;  GFR: 30.98        Exams:     PHYSICAL EXAM:     GENERAL: Vitals recorded well developed, well nourished,  mildly obese;  well groomed;  no apparent distress;     EYES: extraocular movements intact; conjunctiva and cornea are normal; PERRLA;     E/N/T: OROPHARYNX:  normal mucosa, dentition, gingiva, and posterior pharynx;     RESPIRATORY: normal respiratory rate and pattern with no distress; normal breath sounds with no rales, rhonchi, wheezes or rubs;     CARDIOVASCULAR: normal rate; rhythm is regular;  no systolic murmur; no edema;     GASTROINTESTINAL: nontender; normal bowel sounds;     MUSCULOSKELETAL: normal gait; normal overall tone straight leg raise negative bilaterally;     NEUROLOGIC: mental status: alert and oriented x 3;     PSYCHIATRIC:  appropriate affect and demeanor; normal speech pattern; grossly normal memory;         ASSESSMENT           585.3   N18.3  Chronic kidney disease, Stage III (moderate)              DDx:     428.42   I50.42  Combined systolic and diastolic heart failure, chronic              DDx:     250.00   E11.9  Type 2 diabetes              DDx:     285.9   D64.9  Anemia, unspecified              DDx:     724.2   M54.5  Chronic low back pain              DDx:         ORDERS:         Radiology/Test Orders:       88336   Bilateral hip x-ray, minimum of two views of each hip, including anteroposterior view of pelvis  (Send-Out)         51289  Radiologic examination, spine, lumbosacral;  minimum of four views  (Send-Out)           Lab Orders:       [id number]  [facility] CBC with 3 part diff  (Send-Out)         95825  [facility] Comp. Metabolic Panel  (Send-Out)         09232  [facility] B-Type Natriurectic peptide  (Send-Out)         APPTO  Appointment need  (In-House)           Procedures Ordered:       REFER  Referral to Specialist or Other Facility  (Send-Out)         REFER  Referral to Specialist or Other Facility  (Send-Out)         REFER  Referral to Specialist or Other Facility  (Send-Out)         REFER  Referral to Specialist or Other Facility  (Send-Out)                   PLAN:          Chronic kidney disease, Stage III (moderate) Pt referred to nephrology for CKD III or IV. CBC and CMP ordered to establish baseline Cr and anemia of chronic disease.     LABORATORY:  Labs ordered to be performed today include CBC and Comprehensive metabolic panel.      REFERRALS:  Referral initiated to a nephrologist.      FOLLOW-UP: Schedule a follow-up appointment in 2 weeks..            Orders:       REFER  Referral to Specialist or Other Facility  (Send-Out)         62960  [facility] CBC with 3 part diff  (Send-Out)         70507  [facility] Comp. Metabolic Panel  (Send-Out)         APPTO  Appointment need  (In-House)            Combined systolic and diastolic heart failure, chronic Pt referred to cardioliogy for combined systolic and diastolic heart failure. BNP elevated recently and will recheck to establish baseline BNP.     LABORATORY:  Labs ordered to be performed today include BNP.      REFERRALS:  Referral initiated to a cardiologist ( Dr. Neil Amaya, [facility] ).            Orders:       REFER  Referral to Specialist or Other Facility  (Send-Out)         13265  [facility] B-Type Natriurectic peptide   (Send-Out)            Type 2 diabetes A1c is more in the pre-diabetic range so we are decreasing Lantus from 25 units BID to 12 units BID. He is also advised that fruit has sugar so limit consumption to once or twice a day.          Anemia, unspecified Hgb rather low on labs recently (9.5) This appears to be multifactorial, a combination of CKD, anemia of chronic disease, and maybe a contribution of indolent GI bleed given bleeding ulcer that was seen just 10 months ago. Pt is therefore referred to general surgery to discuss whether or not repeat EGD is indicated. Repeat CBC is ordered and hgb improved to 10.2.         REFERRALS:  Referral initiated to a general surgeon ( Dr. Rocco Cartwright ).            Orders:       REFER  Referral to Specialist or Other Facility  (Send-Out)            Chronic low back pain x-ray of lumbar spine and hips is ordered to assess back pain. Findings are relatively mild/benign on x-ray consisting mainly of DDD of lumbar spine and arthritis of hips. Pt is referred to physical therapy. Presentation most c/w muscle spasm/strain and may improve with baclofen.         RADIOLOGY:  I have ordered a bilateral hip x-ray and Lumbar/Sacral Spine X-ray to be done today.      REFERRALS:  Referral initiated to physical therapy.            Orders:       [id number]  Bilateral hip x-ray, minimum of two views of each hip, including anteroposterior view of pelvis  (Send-Out)         26826  Radiologic examination, spine, lumbosacral;  minimum of four views  (Send-Out)         REFER  Referral to Specialist or Other Facility  (Send-Out)               Patient Recommendations:        For  Chronic kidney disease, Stage III (moderate):     Schedule a follow-up visit in 2 weeks.                APPOINTMENT INFORMATION:        Monday Tuesday Wednesday Thursday Friday Saturday Sunday            Time:___________________AM  PM   Date:_____________________             CHARGE CAPTURE           **Please note: ICD  descriptions below are intended for billing purposes only and may not represent clinical diagnoses**        Primary Diagnosis:         585.3 Chronic kidney disease, Stage III (moderate)            N18.3    Chronic kidney disease, stage 3 (moderate)              Orders:          47742   Office/outpatient visit; established patient, level 4  (In-House)             APPTO   Appointment need  (In-House)           428.42 Combined systolic and diastolic heart failure, chronic            I50.42    Chronic combined systolic (congestive) and diastolic (congestive) heart failure    250.00 Type 2 diabetes            E11.9    Type 2 diabetes mellitus without complications    285.9 Anemia, unspecified            D64.9    Anemia, unspecified    724.2 Chronic low back pain            M54.5    Low back pain            general

## 2021-05-18 NOTE — PROGRESS NOTES
Fidel Cleary  1935     Office/Outpatient Visit    Visit Date: Tue, Jan 28, 2020 11:14 am    Provider: Rocco Haskins MD (Assistant: Angela Shea LPN)    Location: [facility]        Electronically signed by Rocco Haskins MD on  02/06/2020 10:03:24 AM                             Subjective:        CC: Mr. Cleary is a 84 year old White male.  He is here today following a transition of care from an inpatient hospital: [facility]. The patient was admitted on 1/21/20 and discharged on 1/25/20. The patient was admitted for hematuria related to renal mass. Our office called the patient within 48 hours of discharge and scheduled the follow-up appointment. During the patient's hospital stay the patient was treated by Randa Posada Martin. Medications have been reviewed and reconciled with discharge summary..          HPI:       Pt went to [facility] on 1/21 for urinary retention. He ended up being admitted to [facility] from 1/21 to 1/25 for gross hematuria s/p cystoscopy 1 week prior by Dr. Tolentino. He has a left renal mass that is suspicious for RCC. Initially they planned a wedge nephrectomy and this has been changed to a percutaneous cryoablation on 2/18/20 with urology and interventional radiology, maybe pulmonology and cardiology. Pt saw Dr. Tolentino yesterday, his wife will remove his couch tomorrow and pt will f/u again with Dr. Tolentino tomorrow after couch is removed.      Iron level is very low in setting of renal mass. He is taking ferrous sulfate TID with vitamin C. Pt was seeing  and was receiving iron infusions until he had a reaction to this.      Pt will have percutaneous cryoablation on 2/18/20 with urology and interventional radiology, maybe pulmonology and cardiology.    ROS:     CONSTITUTIONAL:  Positive for fatigue ( severe ).   Negative for fever.      EYES:  Negative for blurred vision.      E/N/T:  Negative for diminished hearing and nasal congestion.       CARDIOVASCULAR:  Negative for chest pain, orthopnea ( resolved with lasix ) and palpitations.      RESPIRATORY:  Positive for dyspnea ( with moderate exertion; with mild exertion ).   Negative for recent cough.      GASTROINTESTINAL:  Positive for heartburn ( better with zantac ) and melena ( he is on iron tabs ).   Negative for abdominal pain, constipation, diarrhea, hematochezia, nausea or vomiting.      GENITOURINARY:  Positive for hematuria and urinary retention.   Negative for dysuria.      MUSCULOSKELETAL:  Positive for arthralgias and (diffuse) back pain.   Negative for myalgias.      NEUROLOGICAL:  Negative for paresthesias and weakness.      PSYCHIATRIC:  Negative for anxiety, depression and sleep disturbance.          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         Surgical History:         Cataract Removal: ;     left toe amputated 3-18-18;     Other Surgeries:    lipoma right arm 2210-2130;    CABG 2004;    penile implant 2006;    Bladder cancer surgery ;     Procedures: cardiac stents 2003 vascular surgery left leg 2018 and [facility]          Family History:     Father:  at age 78; Cause of death was heart related     Mother:  at age 89     Brother(s): 4 brother(s) total; 4 ;  Arrhythmia;  COPD;  Parkinson's Disease;  MVA age 19     Sister(s): 1 sister(s) total; 1 ;  heart related     Son(s): 1 son(s) total; 1 ;  Leukemia (  age 38 )     Daughter(s): Healthy; 1 daughter(s) total         Social History:     Occupation: Retired (Prior occupation: [employer] )     Marital Status: / /remarried     Children: 2  children         Tobacco/Alcohol/Supplements:     Last Reviewed on 1/17/2020 06:41 PM by Rocco Haskins    Tobacco: He has a past history of cigarette smoking; quit date:  1990.          Substance Abuse History:     Last Reviewed on 1/17/2020 06:41 PM by Rocco Haskins        Mental Health History:     Last Reviewed on 1/17/2020 06:41 PM by Rocco Haskins        Communicable Diseases (eg STDs):     Last Reviewed on 1/17/2020 06:41 PM by Rocco Haskins        Current Problems:     Last Reviewed on 1/17/2020 06:41 PM by Rocco Haskins    Hypothyroidism, unspecified    Type 2 diabetes mellitus without complications    Other iron deficiency anemias    Atherosclerotic heart disease of native coronary artery without angina pectoris    Peripheral vascular disease, unspecified    Inflammatory polyarthropathy    Gout, unspecified    Heartburn    Chronic combined systolic (congestive) and diastolic (congestive) heart failure    Hyperlipidemia, unspecified    Low back pain    Chronic kidney disease, stage 3 (moderate)    Neoplasm of uncertain behavior of left kidney    Anemia, unspecified    Obstructive sleep apnea (adult) (pediatric)    Essential (primary) hypertension    Encounter for immunization    Encounter for follow-up examination after completed treatment for conditions other than malignant neoplasm    Urinary tract infection, site not specified    Prediabetes        Immunizations:     Influenza, high dose seasonal 12/26/2019    pneumococcal polysaccharide PPV23 (PNEUMOVAX 23) 11/13/2019    Fluzone High-Dose pf (>=65 yr) 11/1/2018        Allergies:     Last Reviewed on 1/17/2020 06:41 PM by Rocco Haskins    Naprosyn:      mold:      Dust mites:          Current Medications:     Last Reviewed on 1/17/2020 06:41 PM by Rocco Haskins    metoprolol succinate 25 mg oral Tablet, Extended Release 24 hr [Take 1/2 tablet daily]    Bone D3 Immune 4000mg tablet po daily @ 12n     Multivitamin tablet po daily       "Fish Oil      tamsulosin 0.4 mg oral capsule [1 tab daily]    Nitrostat 0.4 mg Sublingual Tablet, Sublingual [as directed]    Synthroid 0.175mg Tablet [Take 1 tablet(s) by mouth daily]    Finasteride 5 mg oral tablet [Take 1 tablet(s) by mouth daily for prostate]    levothyroxine 175 mcg oral tablet [TAKE 1 TABLET DAILY]    Lantus U-100 Insulin 100 unit/mL subcutaneous Solution [4 units BID]    gabapentin 300 mg oral capsule [1 tablet po qhs]    furosemide 40 mg oral tablet [1 tablet po tid ]    atorvastatin 40 mg oral tablet [1 tab daily]    Vitamin C 250 mg oral tablet [1 tab TID with iron supplement]    Uloric 40 mg oral tablet [TAKE 1 TABLET DAILY]    MagOx 400 mg (241.3 mg magnesium) oral tablet [1 po qd]    Blood Glucose Test strips  [Check blood sugars twice daily. Dispense brand covered by insurance. E11.9]    Accu-Chek Multiclix Lancets  Lancet [Check blood sugar BID, Dx: E11.9]    lisinopril 10 mg oral tablet [take 1 tablet (10 mg) by oral route once daily]    Pen Needle 31 gauge x 1/4\" [use as directed ]    SPIRONOLACTONE 25 MG TABLET        Objective:        Vitals:         Current: 1/28/2020 11:21:18 AM    Ht:  5 ft, 10 in;  Wt: 213.6 lbs;  BMI: 30.6T: 97.8 F (oral);  BP: 127/54 mm Hg (right arm, sitting);  P: 85 bpm (right arm (BP Cuff), sitting);  sCr: 1.87 mg/dL;  GFR: 31.58        Exams:     PHYSICAL EXAM:     GENERAL: Vitals recorded well developed, well nourished,  mildly obese;  well groomed;  no apparent distress, tired-appearing;     EYES: extraocular movements intact; conjunctiva and cornea are normal; PERRLA;     E/N/T: OROPHARYNX:  normal mucosa, dentition, gingiva, and posterior pharynx;     NECK: range of motion is normal; trachea is midline; thyroid is non-palpable;     RESPIRATORY: normal respiratory rate and pattern with no distress; rales (\"crackles\") present in the bases;     CARDIOVASCULAR: normal rate; rhythm is regular;  no systolic murmur;     GASTROINTESTINAL: nontender; slightly " distended; normal bowel sounds;     MUSCULOSKELETAL: gait: uses a cane;  normal overall tone     NEUROLOGIC: mental status: alert and oriented x 3; GROSSLY INTACT     PSYCHIATRIC:  appropriate affect and demeanor; normal speech pattern; grossly normal memory;         Assessment:         R31.0   Gross hematuria       D50.8   Other iron deficiency anemias       D41.02   Neoplasm of uncertain behavior of left kidney           ORDERS:         Lab Orders:       17659  BDCBC - [facility] CBC with 3 part diff  (Send-Out)            84113  FERR - [facility] Ferritin Serum  (Send-Out)            21002  RETEC - [facility] Reticulocyte count  (Send-Out)            22189  IRONP - [facility] Iron and TIBC  (Send-Out)            08651  COMP - [facility] Comp. Metabolic Panel  (Send-Out)            APPTO  Appointment need  (In-House)                      Plan:         Gross hematuriaPt currently has couch cath in place with bag attached to his leg. This is draining grossly bloody urine although not rolando blood. Pt will f/u with urologist Dr. Tolentino tomorrow and hopefully have a percutaneous cryoablation on 2/18/20 with urology and interventional radiology, maybe pulmonology and cardiology.         Other iron deficiency anemiasCont ferrous sulfate and vitamin C TID.    LABORATORY:  Labs ordered to be performed today include Anemia profile CBC ferritin Reticulocyte ct Serum iron.            Orders:       [id number]  BDCBC - [facility] CBC with 3 part diff  (Send-Out)            84758  FERR - [facility] Ferritin Serum  (Send-Out)            50377  RETEC - [facility] Reticulocyte count  (Send-Out)            42883  IRONP - [facility] Iron and TIBC  (Send-Out)              Neoplasm of uncertain behavior of left kidneyAs above, pt will f/u with urologist Dr. Tolentino tomorrow and hopefully have a percutaneous cryoablation on 2/18/20 with urology and interventional radiology, maybe pulmonology and cardiology.     LABORATORY:  Labs ordered to be performed today include Comprehensive metabolic panel.       FOLLOW-UP: Schedule a follow-up visit in 2 months.:.            Orders:       [id number]  COMP - [facility] Comp. Metabolic Panel  (Send-Out)            APPTO  Appointment need  (In-House)                  Patient Recommendations:        For  Neoplasm of uncertain behavior of left kidney:    Schedule a follow-up visit in 2 months.                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

## 2021-05-18 NOTE — PROGRESS NOTES
Fidel Cleary  1935     Office/Outpatient Visit    Visit Date: Fri, Mar 27, 2020 11:28 am    Provider: Rocco Haskins MD (Assistant: Shadia Argueta MA)    Location: [facility]        Electronically signed by Rocco Haskins MD on  04/18/2020 04:42:51 PM                             Subjective:        CC: INSULIN 2 units qid but taking bid.Mr. Cleary is a 84 year old White male.  He is here today following a transition of care from an inpatient hospital: [facility]. The patient was admitted on 3/18-19/20. The patient was admitted for [CKD]. Our office called the patient within 48 hours of discharge and scheduled the follow-up appointment. During the patient's hospital stay the patient was treated by Dr. Dumont. Medications have been reviewed and reconciled with discharge summary..          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 Pt was discharged to rehab. For the 3 days prior to admission he had decreased urine output as well as lower extremity swelling and he was therefore transferred to the ER. Cr was found to be 3.73 and hgb 7.7. Pt was admitted for decreased UOP, acute on chronic CKD and possible heart failure exacerbation. He was treated with IV Lasix, IV iron infusion, and procrit. Cr returned to baseline of 3.2 and his shortness of breath improved.      Pt is on metoprolol 12.5 mg qd. Lisinopril 10 gm qd, spironolactone 12.5 mg qd, and lasix 40 mg qd were d/c'd during hospitalization.      A1c has been 5.8 -> 5.2 -> 6.9 from 7/17/19 -> 10/15/19 -> 1/17/20. Lantus 4 units BID was d/c'd during hospitalization.      Pt had percutaneous cryoablation on 2/18/20 with urology and interventional radiology. He coded just after surgery on his way to recovery. He was intubated and spent almost 2 weeks in the ICU.      Chronic low back pain. He describes it as all  the way across just above buttocks. Stabbing pain, makes him bend over. It can be 10/10 at its worst and is typically 6/10. He did some PT for this in [place]. This is worse with movement and better as the day goes on.     ROS:     CONSTITUTIONAL:  Positive for fatigue ( severe ).   Negative for fever.      EYES:  Negative for blurred vision.      E/N/T:  Negative for diminished hearing and nasal congestion.      CARDIOVASCULAR:  Negative for chest pain, orthopnea ( resolved with lasix ) and palpitations.      RESPIRATORY:  Positive for dyspnea ( with mild exertion ).   Negative for recent cough.      GASTROINTESTINAL:  Positive for melena ( he is on iron tabs ).   Negative for abdominal pain, constipation, diarrhea, hematochezia, nausea or vomiting.      GENITOURINARY:  Positive for hematuria and urinary retention.   Negative for dysuria.      MUSCULOSKELETAL:  Positive for arthralgias and (diffuse) back pain.   Negative for myalgias.      NEUROLOGICAL:  Negative for paresthesias and weakness.      PSYCHIATRIC:  Negative for anxiety, depression and sleep disturbance.          Past Medical History / Family History / Social History:         Last Reviewed on 2020 04:38 PM by Rocco Haskins    Past Medical History:             PAST MEDICAL HISTORY         Congestive Heart Failure: dx'd in ;     Coronary Artery Disease: dx'd in ;     Chronic Renal Failure: dx'd in ;     diabetic ucler left toe 17         Surgical History:         Cataract Removal: ;     left toe amputated 3-18-18;     Other Surgeries:    lipoma right arm [id number];    CABG 2004;    penile implant 2006;    Bladder cancer surgery ;     Procedures: cardiac stents 2003 vascular surgery left leg 2018 and Candi 2018         Family History:     Father:  at age 78; Cause of death was heart related     Mother:  at age 89     Brother(s): 4 brother(s) total; 4 ;  Arrhythmia;  COPD;   Parkinson's Disease;  MVA age 19     Sister(s): 1 sister(s) total; 1 ;  heart related     Son(s): 1 son(s) total; 1 ;  Leukemia (  age 38 )     Daughter(s): Healthy; 1 daughter(s) total         Social History:     Occupation: Retired (Prior occupation: [employer] )     Marital Status: / /remarried     Children: 2 children         Tobacco/Alcohol/Supplements:     Last Reviewed on 2020 04:38 PM by Rocco Haskins    Tobacco: He has a past history of cigarette smoking; quit date:  .          Substance Abuse History:     Last Reviewed on 2020 04:38 PM by Rocco Haskins        Mental Health History:     Last Reviewed on 2020 04:38 PM by Rocco Haskins        Communicable Diseases (eg STDs):     Last Reviewed on 2020 04:38 PM by Rocco Haskins        Current Problems:     Last Reviewed on 2020 04:38 PM by Rocco Haskins    Other iron deficiency anemias    Hypothyroidism, unspecified    Type 2 diabetes mellitus without complications    Peripheral vascular disease, unspecified    Atherosclerotic heart disease of native coronary artery without angina pectoris    Chronic combined systolic (congestive) and diastolic (congestive) heart failure    Heartburn    Gout, unspecified    Inflammatory polyarthropathy    Hyperlipidemia, unspecified    Low back pain    Chronic kidney disease, stage 3 (moderate)    Neoplasm of uncertain behavior of left kidney    Anemia, unspecified    Obstructive sleep apnea (adult) (pediatric)    Essential (primary) hypertension    Encounter for immunization    Encounter for follow-up examination after completed treatment for conditions other than malignant neoplasm    Urinary tract infection, site not specified    Prediabetes    Gross hematuria        Immunizations:     Influenza, high dose seasonal 2019    pneumococcal polysaccharide PPV23 (PNEUMOVAX 23) 2019    Fluzone High-Dose pf (>=65 yr) 2018         "Allergies:     Last Reviewed on 4/18/2020 04:38 PM by Rocco Haskinsrosyn:      mold:      Dust mites:          Current Medications:     Last Reviewed on 4/18/2020 04:38 PM by Rocco Haskins    docusate sodium 100 mg oral capsule [take 1 capsule (100 mg) by oral route 2 times per day]    famotidine 20 mg oral tablet [take 1 tablet (20 mg) by oral route 2 times per day]    insulin lispro 100 unit/mL subcutaneous Solution [inject by subcutaneous route per prescriber's instructions. Insulin dosing requires individualization.]    polyethylene glycoL 3350 17 gram oral Powder in Packet [take 1 packet (17 gram) mixed with 8 oz. water, juice, soda, coffee or tea by oral route twice daily]    Finasteride 5 mg oral tablet [Take 1 tablet(s) by mouth daily for prostate]    Bone D3 Immune 4000mg tablet po daily @ 12n     Multivitamin tablet po daily      tamsulosin 0.4 mg oral capsule [1 tab daily]    Nitrostat 0.4 mg Sublingual Tablet, Sublingual [as directed]    gabapentin 300 mg oral capsule [1 tablet po qhs]    levothyroxine 175 mcg oral tablet [TAKE 1 TABLET DAILY]    atorvastatin 40 mg oral tablet [TAKE 1 TABLET DAILY]    metoprolol succinate 25 mg oral Tablet, Extended Release 24 hr [Take 1/2 tablet daily]    Uloric 40 mg oral tablet [TAKE 1 TABLET DAILY]    Vitamin C 250 mg oral tablet [1 tab TID with iron supplement]    Blood Glucose Test strips  [Check blood sugars twice daily. Dispense brand covered by insurance. E11.9]    Pen Needle 31 gauge x 1/4\"  [use as directed ]        Objective:        Exams:     PHYSICAL EXAM:     GENERAL: well developed, well nourished,  mildly obese;  well groomed;  no apparent distress, tired-appearing;     EYES: extraocular movements intact;     NECK: range of motion is normal;     RESPIRATORY: normal respiratory rate and pattern with no distress;     MUSCULOSKELETAL: gait: uses a cane;     NEUROLOGIC: mental status: alert and oriented x 3; GROSSLY INTACT     PSYCHIATRIC:  " appropriate affect and demeanor; normal speech pattern; grossly normal memory;         Assessment:         N18.3   Chronic kidney disease, stage 3 (moderate)       I10   Essential (primary) hypertension       E11.9   Type 2 diabetes mellitus without complications       D41.02   Neoplasm of uncertain behavior of left kidney       M54.5   Low back pain           ORDERS:         Meds Prescribed:       [Refilled] gabapentin 300 mg oral capsule [1 tablet po qhs], #90 (ninety) capsules, Refills: 1 (one)                 Plan:         Chronic kidney disease, stage 3 (moderate)Acute on chronic renal failure improved with  IV Lasix, IV iron infusion, and procrit. Pt will continue to follow closely with nephrology. His medical condition is very tenuous as he has CKD IV, profound heart failure, iron deficiency anemia, and cardiac arrest after kidney cryoablation. Pt is currently home resting more comfortably.    Telehealth: Verbal consent obtained for visit to occur via televideo conferencing; Staff, other than provider, present during telephone visit include Shadia Bishop         Essential (primary) hypertensionBP unable to be completely assessed during telehealth visit. Cont metoprolol 12.5 mg qd. Lisinopril 10 gm qd, spironolactone 12.5 mg qd, and lasix 40 mg qd were d/c'd during hospitalization.        Type 2 diabetes mellitus without [id number] trended up recently. He has been hospitalized frequently and glucose and insulin has been managed largely during hospitalizations.          Neoplasm of uncertain behavior of left kidneyPt had percutaneous cryoablation on 2/18/20 with urology and interventional radiology. He coded just after surgery on his way to recovery. He was intubated and spent almost 2 weeks in the ICU.        Low back painGabapentin 300 mg TID for chronic low back pain is reasonable, will cont.           Prescriptions:       [Refilled] gabapentin 300 mg oral capsule [1 tablet po qhs], #90 (ninety)  capsules, Refills: 1 (one)             Charge Capture:         Primary Diagnosis:     N18.3  Chronic kidney disease, stage 3 (moderate)           Orders:      [id number]  Transitional care manage service 14 day discharge  (In-House)              I10  Essential (primary) hypertension     E11.9  Type 2 diabetes mellitus without complications     D41.02  Neoplasm of uncertain behavior of left kidney     M54.5  Low back pain

## 2021-05-18 NOTE — PROGRESS NOTES
Fidel Cleary 1935     Office/Outpatient Visit    Visit Date: Wed, Aug 7, 2019 11:09 am    Provider: Rocco Haskins MD (Assistant: Maribel Rene MA)    Location: [facility]        Electronically signed by Rocco Haskins MD on  08/08/2019 08:43:01 PM                             SUBJECTIVE:        CC:     Mr. Cleary is a 84 year old White male.  Patient presents today for two week follow up         HPI:     Cr 1.93->2.3 on labs ast month. Pt has apt with Dr. Dumont 8/13. No changes in urination since last visit.     BNP has been elevated to just over 5,000 in month of July. He has an apt with Dr. Amaya 8/28. Pt had ECHO earlier this year. He was hospitalized in October 2018 for CHF exacerbation. He lost 7lbs in July, weight stable today. His most exertion comes from walking in the store and does not get very short of breath with doing this. He does not have much swelling now but can get edema in right leg if walking a lot, especially on hard surfaces. He does admit to getting using a motorized cart at the store. He is on lasix 40 mg TID and has CKD.     A1c was 5.8 on labs 7/17/19. He is on lantus 12 units BID (we reduced from 25 units BID at last visit) no other DM 2 medications. He checks his glucose BID and its typically 120 in the morning and at night it 130, might be has high as 160 at night. He used to have very low AM readings. Last time we discussed diet and they are eliminating cinnamon toast crunch. He is eating a little less fruit.     Pt saw a PA with Rocco Cartwright on 8/1. They are under the impression colonocopy and EGD will be held off until later this year but note seems to indicate they will do this sooner rather than later. No blood in stool or black tarry stools.     ROS:     CONSTITUTIONAL:  Positive for fatigue ( moderate ).   Negative for fever.      EYES:  Negative for blurred vision.      E/N/T:  Negative for diminished hearing and nasal congestion.       CARDIOVASCULAR:  Negative for chest pain and palpitations.      RESPIRATORY:  Positive for dyspnea ( with moderate exertion ).   Negative for recent cough.      GASTROINTESTINAL:  Positive for heartburn ( better with zantac ).   Negative for abdominal pain, constipation, diarrhea, hematochezia, melena, nausea or vomiting.      GENITOURINARY:  Negative for dysuria.      MUSCULOSKELETAL:  Positive for arthralgias and (diffuse) back pain.   Negative for myalgias.      INTEGUMENTARY:  Negative for atypical mole(s) and rash.      NEUROLOGICAL:  Negative for paresthesias and weakness.      PSYCHIATRIC:  Negative for anxiety, depression and sleep disturbance.          PMH/FMH/SH:     Last Reviewed on 2019 08:42 PM by Rocco Haskins    Past Medical History:             PAST MEDICAL HISTORY         Congestive Heart Failure: dx'd in ;     Coronary Artery Disease: dx'd in ;     Chronic Renal Failure: dx'd in ;     diabetic ucler left toe 17         Surgical History:         Cataract Removal: ;      left toe amputated 3-18-18;     Other Surgeries:    lipoma right arm [id number];    CABG 2004;    penile implant 2006;    Bladder cancer surgery ;     Procedures: cardiac stents 2003 vascular surgery left leg 2018 and Candi          Family History:     Father:  at age 78; Cause of death was heart related     Mother:  at age 89     Brother(s): 4 brother(s) total; 4 ;  Arrhythmia;  COPD;  Parkinson's Disease;  MVA age 19     Sister(s): 1 sister(s) total; 1 ;  heart related     Son(s): 1 son(s) total; 1 ;  Leukemia (  age 38 )     Daughter(s): Healthy; 1 daughter(s) total         Social History:     Occupation: Retired (Prior occupation: [employer] )     Marital Status: / /remarried     Children: 2 children         Tobacco/Alcohol/Supplements:     Last Reviewed on 2019 08:42 PM by Rocco Haskins    Tobacco: He  has a past history of cigarette smoking; quit date:  1990.          Substance Abuse History:     Last Reviewed on 8/08/2019 08:42 PM by Rocco Haskins        Mental Health History:     Last Reviewed on 8/08/2019 08:42 PM by Rocco Haskins        Communicable Diseases (eg STDs):     Last Reviewed on 8/08/2019 08:42 PM by Rocco Haskins            Current Problems:     Last Reviewed on 8/08/2019 08:42 PM by Rocco Haskins    Chronic low back pain     Anemia, unspecified     Chronic kidney disease, Stage III (moderate)     Combined systolic and diastolic heart failure, chronic     GERD     Unspecified inflammatory polyarthritis     Gout, unspecified     Hyperlipidemia     Coronary artery disease     Congestive heart failure     Unspecified PVD     Acquired hypothyroidism, other specified cause     Type 2 diabetes     Other specified iron deficiency anemia         Immunizations:     Fluzone High-Dose pf (>=65 yr) 11/1/2018         Allergies:     Last Reviewed on 8/08/2019 08:42 PM by Rocco Haskins    Naprosyn:    mold:    Dust mites:        Current Medications:     Last Reviewed on 8/08/2019 08:42 PM by Rocco Haskins    Finasteride 5mg Tablet Take 1 tablet(s) by mouth daily for prostate     Synthroid 0.175mg Tablet Take 1 tablet(s) by mouth daily     Atorvastatin Calcium 40mg Tablet 1 tab daily     Ferrous Sulfate 325mg Tablets 1 tab bid with food     Ranitidine 150mg Tablet 1 tab bid     Uloric 40mg Tablet Take 1 tablet(s) by mouth daily     Vitamin C 250mg Tablets 1 tab BID with iron supplement     Aspirin (ASA) 81mg Tablets, Enteric Coated 1 tab daily     B12 Energy 1000mcg daily     Bone D3 Immune 4000mg tablet po daily     Feosol 350mg tablet po every morning     Fish Oil     Furosemide 40mg Tablets 1 tablet po tid     Gabapentin 300mg Capsules 1 tablet po qhs     Hydroxyzine HCl 25mg Tablet TID     Lantus 100units/1ml Injection 12 units bid     Metoprolol 50mg Tablet 1 tab bid     Midrin - PRN      Multivitamin tablet po daily     Nitrostat 0.4mg Tablets, Sublingual as directed     Pantoprazole 40mg Tablets, Delayed Release Take 1 tablet(s) by mouth bid     Tamsulosin HCl 0.4mg Capsules 1 tab daily     Vitamin C 1000mg tablet po daily         OBJECTIVE:        Vitals:         Current: 8/7/2019 11:14:15 AM    Ht:  5 ft, 10 in;  Wt: 220 lbs;  BMI: 31.6    T: 97.7 F;  BP: 121/56 mm Hg (right arm, sitting);  P: 74 bpm (right arm (BP Cuff), sitting);  sCr: 2.3 mg/dL;  GFR: 26.00        Exams:     PHYSICAL EXAM:     GENERAL: Vitals recorded well developed, well nourished,  mildly obese;  well groomed;  no apparent distress;     EYES: extraocular movements intact; conjunctiva and cornea are normal; PERRLA;     E/N/T: OROPHARYNX:  normal mucosa, dentition, gingiva, and posterior pharynx;     RESPIRATORY: normal respiratory rate and pattern with no distress; normal breath sounds with no rales, rhonchi, wheezes or rubs;     CARDIOVASCULAR: normal rate; rhythm is regular;  no systolic murmur; no edema;     GASTROINTESTINAL: nontender; normal bowel sounds;     MUSCULOSKELETAL: normal gait; normal overall tone straight leg raise negative bilaterally;     NEUROLOGIC: mental status: alert and oriented x 3;     PSYCHIATRIC:  appropriate affect and demeanor; normal speech pattern; grossly normal memory;         ASSESSMENT           585.3   N18.3  Chronic kidney disease, Stage III (moderate)              DDx:     428.42   I50.42  Combined systolic and diastolic heart failure, chronic              DDx:     250.00   E11.9  Type 2 diabetes              DDx:     285.9   D64.9  Anemia, unspecified              DDx:         ORDERS:         Meds Prescribed:       Lantus SoloSTAR (Insulin Glargine (rDNA)) 100units/1ml Injection 8 units BID  #10 (Ten) prefilled pen Refills: 1                 PLAN:          Chronic kidney disease, Stage III (moderate) f/u with Dr. Dumont on 8/13 for CKD III.          Combined systolic and diastolic heart  failure, chronic This appears to be stable at this time per symptoms but BNP is quite elevated at 5,000. f/u with Dr. Amaya later this month. Cont metoprolol, lasix, and statin. Consider adding low dose ACE at next visit.          Type 2 diabetes We are reducing lantus again from 12 units BID to 8 units BID. Pt counseled to reduce bread consumption.           Prescriptions:       Lantus SoloSTAR (Insulin Glargine (rDNA)) 100units/1ml Injection 8 units BID  #10 (Ten) prefilled pen Refills: 1          Anemia, unspecified Pt counseled to call Dr. Rocco Cartwright's office as note seems to indicate colonoscpy and EGD will be performed soon (in contrast to their understanding).             CHARGE CAPTURE           **Please note: ICD descriptions below are intended for billing purposes only and may not represent clinical diagnoses**        Primary Diagnosis:         585.3 Chronic kidney disease, Stage III (moderate)            N18.3    Chronic kidney disease, stage 3 (moderate)              Orders:          [id number]   Office/outpatient visit; established patient, level 4  (In-House)           428.42 Combined systolic and diastolic heart failure, chronic            I50.42    Chronic combined systolic (congestive) and diastolic (congestive) heart failure    250.00 Type 2 diabetes            E11.9    Type 2 diabetes mellitus without complications    285.9 Anemia, unspecified            D64.9    Anemia, unspecified

## 2021-05-28 VITALS
OXYGEN SATURATION: 98 % | BODY MASS INDEX: 35.16 KG/M2 | SYSTOLIC BLOOD PRESSURE: 105 MMHG | HEIGHT: 68 IN | WEIGHT: 232 LBS | DIASTOLIC BLOOD PRESSURE: 50 MMHG | HEART RATE: 71 BPM | RESPIRATION RATE: 15 BRPM | TEMPERATURE: 97.7 F

## 2021-05-28 VITALS
RESPIRATION RATE: 18 BRPM | HEIGHT: 69 IN | SYSTOLIC BLOOD PRESSURE: 116 MMHG | OXYGEN SATURATION: 88 % | BODY MASS INDEX: 35.57 KG/M2 | HEART RATE: 71 BPM | DIASTOLIC BLOOD PRESSURE: 54 MMHG | TEMPERATURE: 97.5 F | WEIGHT: 240.12 LBS

## 2021-05-28 VITALS
DIASTOLIC BLOOD PRESSURE: 61 MMHG | OXYGEN SATURATION: 83 % | SYSTOLIC BLOOD PRESSURE: 135 MMHG | WEIGHT: 236.12 LBS | HEART RATE: 81 BPM | RESPIRATION RATE: 14 BRPM | TEMPERATURE: 98.1 F | HEIGHT: 69 IN | BODY MASS INDEX: 34.97 KG/M2

## 2021-05-28 NOTE — PROGRESS NOTES
Patient: YAZMIN HERNANDEZ     Acct: [id number]     Report: #[id number]  UNIT #: [id number]     : 1935    Encounter Date:2020  PRIMARY CARE: ESTELLE LUNA  ***Signed***  --------------------------------------------------------------------------------------------------------------------  Chief Complaint      Encounter Date      Sep 24, 2020            Primary Care Provider      ESTELLE LUNA            Referring Provider            [facility]            Patient Complaint      Patient is complaining of      PT here today for F/U, Respiratory Failure            VITALS      Height 5 ft 8 in / 172.72 cm      Weight 232 lbs  / 105.184500 kg      BSA 2.18 m2      BMI 35.3 kg/m2      Temperature 97.7 F / 36.5 C - Temporal      Pulse 71      Respirations 15      Blood Pressure 105/50 Sitting, Right Arm      Pulse Oximetry 98%, nasal canula, 2 lpm      Initial Exhaled Nitrous Oxide      Date:  Oct 24, 2019            HPI      The patient is a 85 year old male patient of Dr. Painting with congestive heart    failure and chronic kidney disease. The patient presents for follow up today.             The patient states since his last office visit he did have a hospitalization and    a procedure done by Dr. Tolentino. The patient states he also had a heart attack     since his last office visit and is under the care of Dr. Amaya cardiologist.     The patient states he is now on dialysis and is under the care of Dr. Rudolph.     The patient is here today because he needs recertification on his oxygen. A 6     minute walk test was performed in the office today and his resting oxygen was     95% when the patient was ambulating it dropped down to 86%, 2 liters were     applied and oxygen came back up to 98%. The patient still qualifies for oxygen.     The patient states he gets his oxygen through [facility] and needs our office to     send over recertification today. The patient denies any fever or chills, night     sweats,  hemoptysis,  purulent sputum production, swollen glands in head and     neck, unintentional weight loss, chest pain or chest tightness, abdominal pain,     nausea or vomiting or diarrhea. The patient denies  any headaches, myalgias,     sore throat, changes in sense of taste and smell any coronavirus or flu like     symptoms. The patient states he is able to perform his activities of daily     living with oxygen in place. The patient states he is also scheduled to see  hematologist for anemia.             I reviewed the Review of Systems, medical, surgical and family history and agree    with those as entered.      Copies To:   CARLY RAYMOND      Constitutional:  Denies: Fatigue, Fever, Weight gain, Weight loss, Chills,     Insomnia, Other      Respiratory/Breathing:  Complains of: Shortness of air; Denies: Wheezing, Cough,    Hemoptysis, Pleuritic pain, Other      Endocrine:  Denies: Polydipsia, Polyuria, Heat/cold intolerance, Diabetes, Other      Eyes:  Denies: Blurred vision, Vision Changes, Other      Ears, nose, mouth, throat:  Denies: Mouth lesions, Thrush, Throat pain,     Hoarseness, Allergies/Hay Fever, Post Nasal Drip, Headaches, Recent Head Injury,    Nose Bleeding, Neck Stiffness, Thyroid Mass, Hearing Loss, Ear Fullness, Dry     Mouth, Nasal or Sinus Pain, Dry Lips, Nasal discharge, Nasal congestion, Other      Cardiovascular:  Denies: Palpitations, Syncope, Claudication, Chest Pain, Wake     up Gasping for air, Leg Swelling, Irregular Heart Rate, Cyanosis, Dyspnea on     Exertion, Other      Gastrointestinal:  Denies: Nausea, Constipation, Diarrhea, Abdominal pain,     Vomiting, Difficulty Swallowing, Reflux/Heartburn, Dysphagia, Jaundice, Blo    ating, Melena, Bloody stools, Other      Genitourinary:  Denies: Urinary frequency, Incontinence, Hematuria, Urgency,     Nocturia, Dysuria, Testicular problems, Other      Musculoskeletal:  Denies: Joint Pain, Joint Stiffness,  Joint Swelling, Myalgias,    Other      Hematologic/lymphatic:  DENIES: Lymphadenopathy, Bruising, Bleeding tendencies,     Other      Neurological:  Denies: Headache, Numbness, Weakness, Seizures, Other      Psychiatric:  Denies: Anxiety, Appropriate Effect, Depression, Other      Sleep:  No: Excessive daytime sleep, Morning Headache?, Snoring, Insomnia?, Stop    breathing at sleep?, Other      Integumentary:  Denies: Rash, Dry skin, Skin Warm to Touch, Other      Immunologic/Allergic:  Denies: Latex allergy, Seasonal allergies, Asthma,     Urticaria, Eczema, Other      Immunization status:  No: Up to date            FAMILY/SOCIAL/MEDICAL HX      Surgical History:  Yes: Bladder Surgery (CRYO SURGERY ON LEFT KIDNEY), CABG     (CAGB X4V IN [place]  2004), Head Surgery (cataracts), Orthopedic Surgery (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), Vascular Surgery (LEFT LEG VASCULARIZATION), Other     Surgeries (tumor on right arm); No: Abdominal Surgery, Appendectomy, Bowel     Surgery, Cholecystectomy, Oral Surgery      Heart - Family Hx:  Father, Brother, Sister      Other Family Medical History:  Sister      Smoking status:  Former smoker      Anticoagulation Therapy:  No      Antibiotic Prophylaxis:  No      Medical History:  Yes: Blood Disease (CHRONIC ANEMIA), Chemotherapy/Cancer     (BLADDER CANCER 2008, KIDNEY CANCER), Congestive Heart Failu, Diabetes (TYPE II,    AM BG RUNS ), Heart Attack (FEB 2020), Hemorrhoids/Rectal Prob (GERD,     CONSTIPATION), High Blood Pressure (ON MEDS), Shortness Of Breath (BLAS),     Miscellaneous Medical/oth (TUNNEL DIALYSIS CATHETER RIGHT ); No: Arthritis,     Asthma, Chronic Bronchitis/COPD, Deafness or Ringing Ears, Seizures, Sinus     Trouble      Psychiatric History      none            PREVENTION      Hx Influenza Vaccination:  Yes      Date Influenza Vaccine Given:  Sep 1, 2020      Influenza Vaccine Declined:  No      2 or More Falls in Past Year?:  No      Fall Past Year  with Injury?:  No      Hx Pneumococcal Vaccination:  Yes      Encouraged to follow-up with:  PCP regarding preventative exams.      Chart initiated by      Freda Medina CMA            ALLERGIES/MEDICATIONS      Allergies:        Coded Allergies:             LATEX (Verified  Allergy, Mild, RASH, 8/6/20)           ADHESIVE TAPE (Verified  Allergy, Unknown, RASH, PULLS SKIN OFF, 8/6/20)                  PAPER TAPE OKAY           NAPROXEN (Verified  Allergy, Unknown, RASH, 8/6/20)      Medications    Last Reconciled on 9/24/20 11:28 by VONDA ALVAREZ,       Magnesium Oxide (Magnesium Oxide) 500 Mg Tablet      250 MG PO QDAY, TAB         Reported         8/6/20       Gabapentin (Gabapentin) 300 Mg Capsule      300 MG PO HS, #30 CAP 0 Refills         Reported         8/6/20       Multivitamins (Multi-Vitamin) 1 Each Tablet      1 TAB PO QDAY, #30 TAB 0 Refills         Reported         8/6/20       Krill Oil (Krill Oil) 500 Mg Capsule      500 MG PO QDAY, CAP         Reported         8/6/20       Furosemide (Furosemide) 40 Mg Tablet      40 MG PO BID@09,17, #60 TAB 0 Refills         Reported         8/6/20       Insulin Glargine (Lantus SOLOSTAR) 100 Unit/1 Ml Insuln.pen      2 UNITS SUBQ BID, #1 BOX 0 Refills         Reported         8/6/20       Nitroglycerin (Nitroglycerin) 0.4 Mg Tab.subl      0.4 MG SL Q5MIN PRN for CHEST PAIN, TAB         Reported         4/1/20       Metoprolol Succinate (Metoprolol Succinate) 25 Mg Tab.er.24h      12.5 MG PO BID, #30 TAB 0 Refills         Reported         4/1/20       Polyethylene Glycol (Miralax*) 17 Gm Packet      17 GM PO BID for 30 Days, #60 PACKET         Prov: SimonaAnesh         3/10/20       Docusate Sodium (DOK) 100 Mg Cap      100 MG PO QDAY PRN for CONSTIPATION for 30 Days, #30 CAP         Prov: AlanalaAnesh         3/10/20       Levothyroxine (Synthroid) 0.175 Mg      0.175 MG PO QDAY@07, #30 TAB 0 Refills         Reported         12/10/19       Febuxostat  (Uloric*) 40 Mg Tab      40 MG PO QDAY, TAB         Reported         12/10/19       Famotidine (Pepcid) 20 Mg Tablet      10 MG PO BID, TAB         Reported         12/10/19       Finasteride (Finasteride*) 5 Mg Tablet      5 MG PO HS, #30 TAB 0 Refills         Reported         12/10/19       Tamsulosin HCL (Tamsulosin HCL) 0.4 Mg Capsule      0.4 MG PO HS, #30 CAP 0 Refills         Reported         9/3/19       Atorvastatin Calcium (Lipitor*) 40 Mg Tablet      40 MG PO HS, #30 TAB 0 Refills         Reported         9/3/19      Current Medications      Current Medications Reviewed 9/24/20            EXAM      Vital Signs Reviewed      Gen: WDWN, Alert, NAD.        HEENT:  PERRL, EOMI.  OP, nares clear, no sinus tenderness.      Neck:  Supple, no JVD, no thyromegaly.      Lymph: No axillary, cervical, supraclavicular lymphadenopathy noted bilaterally.      Chest:  Good aeration, diminished breath sounds throughout,  normal work of     breathing noted. The patient is able to speak full sentences without difficulty.          CV:  RRR, no MGR, pulses 2+, equal.      Abd:  Soft, NT, ND, + BS, no HSM.      EXT:  No clubbing, no cyanosis, no edema, no joint tenderness.       Neuro:  A  Skin: No rashes or lesions.      Vtials      Vitals:             Height 5 ft 8 in / 172.72 cm           Weight 232 lbs  / 105.270827 kg           BSA 2.18 m2           BMI 35.3 kg/m2           Temperature 97.7 F / 36.5 C - Temporal           Pulse 71           Respirations 15           Blood Pressure 105/50 Sitting, Right Arm           Pulse Oximetry 98%, nasal canula, 2 lpm            REVIEW      Results Reviewed      PCCS Results Reviewed?:  Yes Prev Lab Results, Yes Prev Radiology Results, Yes     Previous [facility]ec[facility] Records      Lab Results      I personally reviewed Dr. Fontana's last office note.            Assessment      ASSESSMENT:      1. Renal mass concerning for renal cell carcinoma under the care of Dr. Tolentino.       2. Diastolic  congestive heart failure under the care of Dr. Amaya.       3. Recent myocardial infarction under the care of Dr. Amaya.       4. Hx of acute cardiogenic pulmonary edema.       5. 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       6. Tobacco abuse of cigarettes in remission.      7. Anemia under the care of .             PLAN:      1. Continue supplemental oxygen to keep oxygen saturation at or above 89%. The     patient had a 6 minute walk test; performed in the office today and he still     qualifies for oxygen. I will have our clinical coordinator Alexy notify [facility]     and send recertification.       2. Follow up with Dr. Tolentino as scheduled.       3. Follow up with Dr. Amaya for congestive heart failure as scheduled.       4. Follow up with  hematologist as scheduled for anemia.       5. The patient is up to date with flu and pneumonia vaccines.       6. The patient is advised to call the office, call 911 or go to the ER for any     new or worsening symptoms.       7. Follow up in 4-6 months sooner if needed.            Patient Education      Patient Education Provided:  Acute Bronchitis      Time Spent:  > 50% /Coord Care            Electronically signed by VONDA ALVAREZ [facility]  09/27/2020 22:41       Disclaimer: Converted document may not contain table formatting or lab diagrams. Please see [employer] for the authenticated document.

## 2021-05-28 NOTE — PROGRESS NOTES
Patient: YAZMIN HERNANDEZ     Acct: [id number]     Report: #[id number]  UNIT #: [id number]     : 1935    Encounter Date:10/24/2019  PRIMARY CARE: ESTELLE LUNA  ***Signed***  --------------------------------------------------------------------------------------------------------------------  Chief Complaint      Encounter Date      Oct 24, 2019            Primary Care Provider      ESTELLE LUNA            Referring Provider            [facility]            Patient Complaint      Patient is complaining of      F/U CHRONIC HYPOXIC RESPIRATORY FAILURE            VITALS      Height 5 ft 9.00 in / 175.26 cm      Weight 236 lbs 2.000 oz / 107.581504 kg      BSA 2.22 m2      BMI 34.9 kg/m2      Temperature 98.1 F / 36.72 C - Oral      Pulse 81      Respirations 14      Blood Pressure 135/61 Sitting, Right Arm      Pulse Oximetry 83%, NASAL CANNULA, 2.0 lpm      Initial Exhaled Nitrous Oxide      Date:  Oct 24, 2019      Exhaled Nitrous Oxide Results:  41            HPI      The patient is a very pleasant 84 year old  male with congestive heart     failure and chronic kidney disease here for follow up today.             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 He has a kidney mass and is supposed to be undergoing     nephrectomy by Dr. Tolentino. He also has chronic kidney disease. He comes in today    complaining of shortness of breath at baseline.  He also has orthopnea and leg     swelling. He has not lost any weight. He is holding his diuretics per his     cardiologist Dr. Amaya. CT scan was done showing kidney mass but also showed     enlarging bilateral pleural effusions. He did smoke for 45 years, 1 pack per day    and is a former smoker. He denies any coughing, wheezing, headaches and     hemoptysis or chest pain. He can go up about half a flight of steps without     having to stop because of shortness of breath.  He is able to perform his  ADL's     without difficulty and denies any swollen glands in his lymph nodes, head or     neck.            I personally reviewed Review of Systems, family, social, surgical and medical     history and agree with their findings.            ROS      Constitutional:  Denies: Fatigue, Fever, Weight gain, Weight loss, Chills,     Insomnia, Other      Respiratory/Breathing:  Complains of: Shortness of air; Denies: Wheezing, Cough,    Hemoptysis, Pleuritic pain, Other      Endocrine:  Denies: Polydipsia, Polyuria, Heat/cold intolerance, Diabetes, Other      Eyes:  Denies: Blurred vision, Vision Changes, Other      Ears, nose, mouth, throat:  Denies: Mouth lesions, Thrush, Throat pain,     Hoarseness, Allergies/Hay Fever, Post Nasal Drip, Headaches, Recent Head Injury,    Nose Bleeding, Neck Stiffness, Thyroid Mass, Hearing Loss, Ear Fullness, Dry     Mouth, Nasal or Sinus Pain, Dry Lips, Nasal discharge, Nasal congestion, Other      Cardiovascular:  Denies: Palpitations, Syncope, Claudication, Chest Pain, Wake     up Gasping for air, Leg Swelling, Irregular Heart Rate, Cyanosis, Dyspnea on     Exertion, Other      Gastrointestinal:  Denies: Nausea, Constipation, Diarrhea, Abdominal pain,     Vomiting, Difficulty Swallowing, Reflux/Heartburn, Dysphagia, Jaundice,     Bloating, Melena, Bloody stools, Other      Genitourinary:  Denies: Urinary frequency, Incontinence, Hematuria, Urgency,     Nocturia, Dysuria, Testicular problems, Other      Musculoskeletal:  Denies: Joint Pain, Joint Stiffness, Joint Swelling, Myalgias,    Other      Hematologic/lymphatic:  DENIES: Lymphadenopathy, Bruising, Bleeding tendencies,     Other      Neurological:  Denies: Headache, Numbness, Weakness, Seizures, Other      Psychiatric:  Denies: Anxiety, Appropriate Effect, Depression, Other      Sleep:  No: Excessive daytime sleep, Morning Headache?, Snoring, Insomnia?, Stop    breathing at sleep?, Other      Integumentary:  Denies: Rash, Dry  "skin, Skin Warm to Touch, Other      Immunologic/Allergic:  Denies: Latex allergy, Seasonal allergies, Asthma,     Urticaria, Eczema, Other      Immunization status:  No: Up to date            FAMILY/SOCIAL/MEDICAL HX      Surgical History:  Yes: CABG, Vascular Surgery (LEFT LEG ), Other Surgeries     (tumor on right arm)      Heart - Family Hx:  Father, Brother, Sister      Other Family Medical History:  Sister      Is Father Still Living?:  No      Is Mother Still Living?:  No       Family History:  Yes      Social History:  No Tobacco Use, No Alcohol Use, No Recreational Drug use      Smoking status:  Former smoker (1 pack per week x 40y, quit 1994)      Anticoagulation Therapy:  No      Antibiotic Prophylaxis:  No      Medical History:  Yes: Arthritis, Chemotherapy/Cancer (BLADDER CANCER (2008);     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 (SURGERY 9/24/19)), Congestive Heart Failu,     Diabetes (TYPE II), Heart Attack (CARDIAC STENTS X3 (2003); 4 VESSEL CABG     (2004)), Hemorrhoids/Rectal Prob (TAKES RANITIDINE FOR \"IRRITATED ESOPHAGUS\"),     High Blood Pressure; No: Blood Disease, Deafness or Ringing Ears, Sinus Trouble      Psychiatric History      NONE            PREVENTION      Hx Influenza Vaccination:  Yes      Date Influenza Vaccine Given:  Oct 24, 2019      Influenza Vaccine Declined:  No      2 or More Falls Past Year?:  No      Fall Past Year with Injury?:  No      Hx Pneumococcal Vaccination:  Yes      Encouraged to follow-up with:  PCP regarding preventative exams.      Chart initiated by      TONY DEJESUS/ MA            ALLERGIES/MEDICATIONS      Allergies:        Coded Allergies:             LATEX (Verified  Allergy, Unknown, RASH, 10/24/19)           NAPROXEN (Verified  Allergy, Unknown, RASH, 10/24/19)      Medications    Last Reconciled on 10/24/19 11:54 by CARLY RAYMOND MD      Furosemide* (Lasix*) 40 Mg Tablet      40 MG PO BID@09,17, #60 TAB 0 Refills         Reported         9/30/19     "   Metoprolol Succinate (Metoprolol Succinate) 25 Mg Tab.er.24h      12.5 MG PO QDAY for 30 Days, #15 TAB.ER         Prov: Jax Jarvis         9/13/19       Insulin Glargine (Lantus VIAL) 100 Units/Ml Vial      8 UNITS SUBQ BID INSULIN, #1 VIAL 0 Refills         Prov: Jax Jarvis         9/13/19       Nitroglycerin (Nitroglycerin) 0.4 Mg Tab.subl      0.4 MG SL Q5MIN PRN for CHEST PAIN, TAB         Reported         9/3/19       Magnesium Oxide (Magnesium Oxide*) 400 Mg Tablet      400 MG PO QDAY, #30 TAB 0 Refills         Reported         9/3/19       Multivitamin (Multivitamins) 1 Each Capsule      1 EACH PO QDAY, CAP         Reported         9/3/19       Fish Oil/Borage/Flax/Om3,6,9#1 (Flax Seed/Fish/Borage Oil) 400 Mg Capsule      400 MG PO QDAY, #30 CAP 0 Refills         Reported         9/3/19       Cholecalciferol (Vitamin D3*) 2,000 Unit Tablet      4000 UNITS PO QDAY@12, #60 TAB 0 Refills         Reported         9/3/19       Ascorbic Acid (Vitamin C*) 250 Mg Tablet      250 MG PO BID, TAB         Reported         9/3/19       Ferrous Sulfate (Iron Sulfate*) 325 Mg Tablet      325 MG PO BID, #60 TAB 0 Refills         Reported         9/3/19       Tamsulosin HCL (Tamsulosin HCL) 0.4 Mg Capsule      0.4 MG PO HS, #30 CAP 0 Refills         Reported         9/3/19       Levothyroxine (Levothyroxine) 0.175 Mg Tablet      0.175 MG PO QDAY@07, #30 TAB 0 Refills         Reported         9/3/19       Febuxostat (Uloric*) 40 Mg Tab      40 MG PO QDAY, TAB         Reported         9/3/19       Finasteride (Finasteride*) 5 Mg Tablet      5 MG PO HS, #30 TAB 0 Refills         Reported         9/3/19       Gabapentin (Gabapentin) 300 Mg Capsule      300 MG PO HS, #30 CAP 0 Refills         Reported         9/3/19       Atorvastatin Calcium (Lipitor*) 40 Mg Tablet      40 MG PO HS, #30 TAB 0 Refills         Reported         9/3/19       Ranitidine HCl (Ranitidine HCl) 150 Mg Tablet      150 MG PO BID, #60 TAB 0 Refills          Reported         9/3/19      Current Medications      Current Medications Reviewed 10/24/19            EXAM      Vital Signs Reviewed      Gen: WDWN, Alert, NAD.        HEENT:  PERRL, EOMI.  OP, nares clear, no sinus tenderness.      Neck:  Supple, mild JVD, no thyromegaly.      Chest:  Good aeration, diminished bilaterally with bibasilar crackles, dull to     percussion bilaterally, no work of breathing noted.      CV:  RRR, no MGR, pulses 2+, equal. PMI displaced.        Abd:  Soft, NT, distended, + BS, no HSM.      EXT:  No clubbing, no cyanosis, 3+ pitting edema up to the knees, no joint     tenderness.       Neuro:  A  Skin: No rashes or lesions.      Vtials      Vitals:             Height 5 ft 9.00 in / 175.26 cm           Weight 236 lbs 2.000 oz / 107.848187 kg           BSA 2.22 m2           BMI 34.9 kg/m2           Temperature 98.1 F / 36.72 C - Oral           Pulse 81           Respirations 14           Blood Pressure 135/61 Sitting, Right Arm           Pulse Oximetry 83%, NASAL CANNULA, 2.0 lpm            REVIEW      Results Reviewed      PCCS Results Reviewed?:  Yes Prev Lab Results, Yes Prev Radiology Results, Yes     Previous [facility] Records      Lab Results      I personally reviewed labs from 2019 showing 180 peripheral eosinophils     and a creatinine that is stable around 1.6.      Radiographic Results               [facility]                PACS RADIOLOGY REPORT            Patient: YAZMIN HERNANDEZ   Acct: #[id number]   Report: #[id number]            UNIT #: [id number]    DOS: 10/17/19 1024      INSURANCE:MEDICARE PART A   LOCATION:[facility]     : 1935            PROVIDERS      ADMITTING:     ATTENDING: ESTELLE LUNA      FAMILY:  NONE,MD   ORDERING:  ESTELLE LUNA         OTHER:    DICTATING:  Jerry Barrow MD            REQ #:[id number]   EXAM:ABDPELWO - CT ABDOMEN PELVIS wo CONTRAST      REASON FOR EXAM:   RENAL MASS      REASON FOR VISIT:  RENAL MASS            *******Signed******         PROCEDURE:   CT ABDOMEN PELVIS WITHOUT CONTRAST             COMPARISON:   [facility], US, US BILAT KIDNEYS, 8/15/2019, 12:42.     [facility], , ABDOMEN W/O CONTRAST, 8/21/2019, 14:06.             INDICATIONS:   RENAL MASS/ FOLLOW UP FOR POSSIBLE SURGERY TO REMOVE MASS             TECHNIQUE:   CT images were created without intravenous contrast.               PROTOCOL:     Standard imaging protocol performed                RADIATION:     DLP: 1094.7 mGy*cm          Automated exposure control was utilized to minimize radiation dose.              FINDINGS:         Small bilateral pleural effusions are noted measuring 5.4 cm in thickness on the    right and 2.8 cm       in thickness on the left.  There is mild bibasilar airspace opacity likely     representing       atelectasis.  Coronary artery atherosclerotic changes noted.             Multiple gallstones are noted in the gallbladder.  A small amount of fluid is     noted around the       gallbladder.  Small amount of ascites is noted near the liver.  The liver,     spleen, pancreas and       adrenal glands appear unremarkable.             A 1.7 cm exophytic mass arising from the right kidney superior pole measures 14     Hounsfield units,       compatible with a simple cyst.  Imaging follow-up is not required.  In the mid     left kidney is a 3.5       cm x 3.7 cm partially exophytic mass.  There appear to be internal septations.      The mass is not       completely evaluated without intravenous contrast.  The left kidney otherwise     appears unremarkable.             Several prominent retroperitoneal lymph nodes are noted.  The largest near the     celiac trunk origin       measures 1.2 cm in short axis.  Portal triad lymph nodes measure up to 1.2 cm in    short axis.  There       are several para-aortic and aortocaval lymph nodes noted more  inferiorly     measuring up to 0.9 cm.             A left external iliac lymph node measures 0.8 cm in short axis.  Penile pump has    been placed with       the reservoir in the left inguinal region.             There is diffuse wall thickening of the urinary bladder.  There is infiltration     of the fat       surrounding the bladder.  The prostate and seminal vesicles appear unremarkable.     Colonic       diverticulosis is noted.  No acute bowel abnormality is identified.  The lack of    oral contrast       limits evaluation of the bowel.             No focal osseous lesion is seen.             CONCLUSION:         1. At least partially solid mass in the mid left kidney measuring 3.5 cm x 3.7     cm.  Renal cell       carcinoma and oncocytoma are in the differential.        2. Cholelithiasis      3. Small amount of ascites in the abdomen.  A small amount of fluid surrounding     the gallbladder is       favored to be secondary to the ascites and less likely acute cholecystitis.      Correlate clinically.      4. Bilateral pleural effusions, larger in the interval      5. Mild upper abdominal adenopathy, as above      6. Diffuse wall thickening of the urinary bladder.  Differential includes both     cystitis and chronic       bladder outlet obstruction.  Superimposed neoplasm could potentially be     obscured.              Jerry Barrow M.D.             Electronically Signed and Approved By: Jerry Barrow M.D. on 10/17/2019 at 13:21                           Until signed, this is an unconfirmed preliminary report that may contain      errors and is subject to change.                                              LEFTYRRO:      D:10/17/19 1321            Assessment      CHF (congestive heart failure)         Acute diastolic congestive heart failure - I50.31         Heart failure type: diastolic         Heart failure chronicity: acute            Notes      New Office Procedures      * Fluzone Vaccine High-Dose, Stat          Flu Vacc [id number](65Yr Up)/Pf (Fluzone High-Dose 2019-20 Syr) 180 MCG/0.5 ML       SYRINGE: 180 MICROGRAM INTRAMUSCULARLY Qty 1 SYRINGE         Dx: CHF (congestive heart failure) - I50.9      IMPRESSION:      1. Renal mass concerning for renal cell carcinoma.       2. Acute decompensated diastolic congestive heart failure.       3. Bilateral pleural effusions.       4. Acute cardiogenic pulmonary edema.       5. 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       6. Tobacco abuse of cigarettes in remission.       7. Obesity with BMI 34.9.            PLAN:      1. The patient's pulmonary function test showed mixed obstructive and     restrictive lung defect. This is secondary to congestive heart failure as his CT    scan of the chest shows pleural effusions and pulmonary edema.       2. I recommend that the patient restart his diuretics. I informed he that he     should do it for a small trial. I also discussed this with Dr. Amaya and Dr. Dumont as I think he appears hypervolemic today.       3. I offered thoracentesis but he declined. He knows if he has worsening     symptoms he will notify me for thoracentesis.       4. Continue 2 liters of oxygen to keep SPO2 greater than 90%.       5. I personally reviewed pulmonary function test. From a pulmonary perspective     he is cleared for nephrectomy with moderate risk of perioperative pulmonary     complications. The bulk of his respiratory symptoms is not pulmonary in origin,     this is secondary to cardiac etiology. I would prefer that he be slightly more     euvolemic prior to proceeding with surgery so I have informed him to go back on     his diuretics. I would also discuss the case with Dr. Amaya as he will need     more importantly cardiac clearance for his surgery as opposed to pulmonary     clearance.       6. Up to date with Prevnar and Pneumovax and we will give him a flu shot today.       7.  I spent 4 minutes  discussing diet and exercise counseling. I recommend 2     liter fluid restriction and 2 grams sodium restriction as well as 15 minutes of     daily exercise. The patient verbalized understanding.       8. Follow up with me in 4-6 months.            Patient Education      ACO BMI High above 25:  Counseling Given, Encouraged weight loss, Encourage     dietary changes            Electronically signed by CARLY RAYMOND  10/31/2019 07:21       Disclaimer: Converted document may not contain table formatting or lab diagrams. Please see [employer] for the authenticated document.

## 2021-05-28 NOTE — PROGRESS NOTES
Patient: YAZMIN HERNANDEZ     Acct: [id number]     Report: #[id number]  UNIT #: [id number]     : 1935    Encounter Date:2019  PRIMARY CARE: ESTELLE LUNA  ***Signed***  --------------------------------------------------------------------------------------------------------------------  Chief Complaint      Encounter Date      Sep 30, 2019            Primary Care Provider      ESTELLE LUNA            Referring Provider            [facility]            Patient Complaint      Patient is complaining of      Pt here for [facility] f/u            VITALS      Height 5 ft 9 in / 175.26 cm      Weight 240 lbs 2 oz / 108.705539 kg      BSA 2.23 m2      BMI 35.5 kg/m2      Temperature 97.5 F / 36.39 C - Oral      Pulse 71      Respirations 18      Blood Pressure 116/54 Sitting, Left Arm      Pulse Oximetry 88%, Nasal cannula, 2 lpm            HPI      The patient is a 84 year old white male recently hospitalized at [facility] and seen by Dr. Garcia and Dr. Fontana. He presented on 19 with     weakness that was progressively worsening. He was found to be anemic and had     thrombocytopenia, volumed overloaded from chronic kidney disease and diastolic     heart failure. He was diuresed as tolerated and followed by Dr. Dumont from     nephrology. He has a history of renal cancer and was scheduled for nephrectomy     on 19. He required a blood transfusion and seen by GI for acute blood loss    anemia which is still being worked up. He was diuresed and followed by     nephrology, and he continues to follow up with Dr. Dumont since his discharge     and he is currently taking Lasix 40 mg twice daily. He is being followed by Dr. Tolentino for probable nephrectomy for suspected renal cell carcinoma. He says the     surgery was postponed and he has not been given a new surgery date until he is     cleared by several services including our own. The patient states he has not     been diagnosed with  asthma or chronic obstructive pulmonary disease before. He     is however a former smoker, smoked for 45 years but smoked 1 pack per week. He     still has dyspnea on exertion on supplemental oxygen at 2 liters per minute. He     denies increased dyspnea, coughing or wheezing but reports worsening lower     extremity edema and some abdominal distension. He denies orthopnea. His     diuretics are being managed by Dr. Dumont.             I reviewed his Review of Systems, medical, surgical and family history and agree    with those as entered.      Copies To:   CARLY RAYMOND ;            LARA      Constitutional:  Denies: Fatigue, Fever, Weight gain, Weight loss, Chills,     Insomnia, Other      Respiratory/Breathing:  Complains of: Shortness of air; Denies: Wheezing, Cough,    Hemoptysis, Pleuritic pain, Other      Endocrine:  Denies: Polydipsia, Polyuria, Heat/cold intolerance, Diabetes, Other      Eyes:  Denies: Blurred vision, Vision Changes, Other      Ears, nose, mouth, throat:  Denies: Mouth lesions, Thrush, Throat pain,     Hoarseness, Allergies/Hay Fever, Post Nasal Drip, Headaches, Recent Head Injury,    Nose Bleeding, Neck Stiffness, Thyroid Mass, Hearing Loss, Ear Fullness, Dry     Mouth, Nasal or Sinus Pain, Dry Lips, Nasal discharge, Nasal congestion, Other      Cardiovascular:  Denies: Palpitations, Syncope, Claudication, Chest Pain, Wake     up Gasping for air, Leg Swelling, Irregular Heart Rate, Cyanosis, Dyspnea on     Exertion, Other      Gastrointestinal:  Denies: Nausea, Constipation, Diarrhea, Abdominal pain,     Vomiting, Difficulty Swallowing, Reflux/Heartburn, Dysphagia, Jaundice,     Bloating, Melena, Bloody stools, Other      Genitourinary:  Denies: Urinary frequency, Incontinence, Hematuria, Urgency,     Nocturia, Dysuria, Testicular problems, Other      Musculoskeletal:  Denies: Joint Pain, Joint Stiffness, Joint Swelling, Myalgias,    Other      Hematologic/lymphatic:  DENIES:  "Lymphadenopathy, Bruising, Bleeding tendencies,     Other      Neurological:  Denies: Headache, Numbness, Weakness, Seizures, Other      Psychiatric:  Denies: Anxiety, Appropriate Effect, Depression, Other      Sleep:  No: Excessive daytime sleep, Morning Headache?, Snoring, Insomnia?, Stop    breathing at sleep?, Other      Integumentary:  Denies: Rash, Dry skin, Skin Warm to Touch, Other      Immunologic/Allergic:  Denies: Latex allergy, Seasonal allergies, Asthma,     Urticaria, Eczema, Other      Immunization status:  No: Up to date            FAMILY/SOCIAL/MEDICAL HX      Surgical History:  Yes: CABG, Vascular Surgery (LEFT LEG ), Other Surgeries     (tumor on right arm)      Heart - Family Hx:  Father, Brother, Sister      Other Family Medical History:  Sister      Is Father Still Living?:  No      Is Mother Still Living?:  No      Smoking status:  Former smoker (1 pack per week x 40y, quit 1994)      Anticoagulation Therapy:  No      Antibiotic Prophylaxis:  No      Medical History:  Yes: Arthritis, Chemotherapy/Cancer (BLADDER CANCER (2008);     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 (SURGERY 9/24/19)), Congestive Heart Failu,     Diabetes (TYPE II), Heart Attack (CARDIAC STENTS X3 (2003); 4 VESSEL CABG     (2004)), Hemorrhoids/Rectal Prob (TAKES RANITIDINE FOR \"IRRITATED ESOPHAGUS\"),     High Blood Pressure; No: Blood Disease, Deafness or Ringing Ears      Psychiatric History      None            PREVENTION      Hx Influenza Vaccination:  Yes      Date Influenza Vaccine Given:  Oct 1, 2018      Influenza Vaccine Declined:  No      2 or More Falls Past Year?:  No      Fall Past Year with Injury?:  No      Hx Pneumococcal Vaccination:  Yes      Encouraged to follow-up with:  PCP regarding preventative exams.      Chart initiated by      Kaley Coffman MA            ALLERGIES/MEDICATIONS      Allergies:        Coded Allergies:             LATEX (Verified  Allergy, Unknown, RASH, 9/3/19)           NAPROXEN (Verified  " Allergy, Unknown, RASH, 9/3/19)      Medications    Last Reconciled on 9/30/19 14:32 by LISE YOUNG      Furosemide* (Lasix*) 40 Mg Tablet      40 MG PO BID@09,17, #60 TAB 0 Refills         Reported         9/30/19       Metoprolol Succinate (Metoprolol Succinate) 25 Mg Tab.er.24h      12.5 MG PO QDAY for 30 Days, #15 TAB.ER         Prov: Jax Jarvis         9/13/19       Insulin Glargine (Lantus VIAL) 100 Units/Ml Vial      8 UNITS SUBQ BID INSULIN, #1 VIAL 0 Refills         Prov: Badiwala,Anesh         9/13/19       Nitroglycerin (Nitroglycerin) 0.4 Mg Tab.subl      0.4 MG SL Q5MIN PRN for CHEST PAIN, TAB         Reported         9/3/19       Magnesium Oxide (Magnesium Oxide*) 400 Mg Tablet      400 MG PO QDAY, #30 TAB 0 Refills         Reported         9/3/19       Multivitamin (Multivitamins) 1 Each Capsule      1 EACH PO QDAY, CAP         Reported         9/3/19       Fish Oil/Borage/Flax/Om3,6,9#1 (Flax Seed/Fish/Borage Oil) 400 Mg Capsule      400 MG PO QDAY, #30 CAP 0 Refills         Reported         9/3/19       Cholecalciferol (Vitamin D3*) 2,000 Unit Tablet      4000 UNITS PO QDAY@12, #60 TAB 0 Refills         Reported         9/3/19       Ascorbic Acid (Vitamin C*) 250 Mg Tablet      250 MG PO BID, TAB         Reported         9/3/19       Ferrous Sulfate (Iron Sulfate*) 325 Mg Tablet      325 MG PO BID, #60 TAB 0 Refills         Reported         9/3/19       Tamsulosin HCL (Tamsulosin HCL) 0.4 Mg Capsule      0.4 MG PO HS, #30 CAP 0 Refills         Reported         9/3/19       Levothyroxine (Levothyroxine) 0.175 Mg Tablet      0.175 MG PO QDAY@07, #30 TAB 0 Refills         Reported         9/3/19       Febuxostat (Uloric*) 40 Mg Tab      40 MG PO QDAY, TAB         Reported         9/3/19       Finasteride (Finasteride*) 5 Mg Tablet      5 MG PO HS, #30 TAB 0 Refills         Reported         9/3/19       Gabapentin (Gabapentin) 300 Mg Capsule      300 MG PO HS, #30 CAP 0 Refills          Reported         9/3/19       Atorvastatin Calcium (Lipitor*) 40 Mg Tablet      40 MG PO HS, #30 TAB 0 Refills         Reported         9/3/19       Ranitidine HCl (Ranitidine HCl) 150 Mg Tablet      150 MG PO BID, #60 TAB 0 Refills         Reported         9/3/19      Current Medications      Current Medications Reviewed 9/30/19            EXAM      Vital Signs Reviewed      Gen: WDWN, Alert, NAD.        HEENT:  PERRL, EOMI.  OP, nares clear, no sinus tenderness.      Neck:  Supple, no JVD, no thyromegaly.      Lymph: No axillary, cervical, supraclavicular lymphadenopathy noted bilaterally.      Chest:  Mildly decreased breath sounds throughout, no wheezes, rhonchi or crack    les, normal work of breathing noted.        CV:  RRR, no MGR, pulses 2+, equal.      Abd:  Soft, NT, ND, + BS, no HSM. There is mild abdominal distension, no     tenderness to palpation.        EXT:  No clubbing, no cyanosis, +3 pitting edema up to knees, no joint     tenderness.       Neuro:  A  Skin: No rashes or lesions.      Vtials      Vitals:             Height 5 ft 9 in / 175.26 cm           Weight 240 lbs 2 oz / 108.609718 kg           BSA 2.23 m2           BMI 35.5 kg/m2           Temperature 97.5 F / 36.39 C - Oral           Pulse 71           Respirations 18           Blood Pressure 116/54 Sitting, Left Arm           Pulse Oximetry 88%, Nasal cannula, 2 lpm            REVIEW      Results Reviewed      P[facility]S Results Reviewed?:  Yes Prev Lab Results, Yes Prev Radiology Results, Yes     Previous [facility] Records (I personally reviewed the patient's most recent     pulmonary consultation, progress notes and discharge summary.)            Assessment      SOB (shortness of breath) - R06.02            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 Recent acute on chronic hypoxic respiratory failure remains on continuous     supplemental oxygen.      2. Acute on chronic diastolic heart failure exacerbation remains volume     overloaded.       3. Recent acute on chronic kidney disease being followed by Dr. Dumont.      4. History of suspected renal cell carcinoma being followed by Dr. Tolentino for     nephrectomy.       5. Acute on chronic anemia followed by Dr. Echevarria.       6. Thrombocytopenia.       7. Tobacco abuse of cigarettes in remission longstanding.       8. Obesity with BMI 35.5.            PLAN:      1. I have discussed with the patient regarding his recent hospitalization and     current management. He appears to be volume overloaded today, his weight remains    at 234 pounds over the past few days but had been at least 10 pounds lighter in     the prior month. Continue to follow up with Dr. Dumont as he may need a higher     dose of diuretic as he is not diuresing on the Lasix 40 mg twice daily. He     already has specific instruction when to call Dr. Dumont if his weight continues    to increase.       2. Continue supplemental oxygen to keep oxygen saturation at 88% or above.       3. Dr. Tolentino is requesting pulmonary clearance so I will order baseline     pulmonary function test as soon as possible given his smoking history.       4. Continue to follow up with GI service Dr. Echevarria regarding his anemia.       5. The patient lives in [place] so he can follow up there with Dr. Fontana to     discuss his test results, sooner if needed.            Patient Education      Time Spent:  > 50% /Coord Care                 Disclaimer: Converted document may not contain table formatting or lab diagrams. Please see [employer] for the authenticated document.

## 2021-07-01 VITALS
BODY MASS INDEX: 33.82 KG/M2 | WEIGHT: 236.2 LBS | HEIGHT: 70 IN | TEMPERATURE: 97.5 F | DIASTOLIC BLOOD PRESSURE: 49 MMHG | SYSTOLIC BLOOD PRESSURE: 131 MMHG | OXYGEN SATURATION: 88 % | HEART RATE: 75 BPM

## 2021-07-01 VITALS
HEART RATE: 67 BPM | TEMPERATURE: 97.9 F | DIASTOLIC BLOOD PRESSURE: 47 MMHG | HEIGHT: 70 IN | BODY MASS INDEX: 32.47 KG/M2 | SYSTOLIC BLOOD PRESSURE: 127 MMHG | WEIGHT: 226.8 LBS

## 2021-07-01 VITALS
HEIGHT: 70 IN | TEMPERATURE: 97 F | WEIGHT: 219.8 LBS | BODY MASS INDEX: 31.47 KG/M2 | SYSTOLIC BLOOD PRESSURE: 118 MMHG | HEART RATE: 71 BPM | DIASTOLIC BLOOD PRESSURE: 76 MMHG

## 2021-07-01 VITALS
HEIGHT: 70 IN | TEMPERATURE: 97.4 F | BODY MASS INDEX: 32.96 KG/M2 | SYSTOLIC BLOOD PRESSURE: 160 MMHG | WEIGHT: 230.2 LBS | DIASTOLIC BLOOD PRESSURE: 74 MMHG | HEART RATE: 82 BPM

## 2021-07-01 VITALS
WEIGHT: 220 LBS | SYSTOLIC BLOOD PRESSURE: 121 MMHG | HEIGHT: 70 IN | HEART RATE: 74 BPM | BODY MASS INDEX: 31.5 KG/M2 | DIASTOLIC BLOOD PRESSURE: 56 MMHG | TEMPERATURE: 97.7 F

## 2021-07-01 VITALS
BODY MASS INDEX: 34.04 KG/M2 | TEMPERATURE: 97.5 F | WEIGHT: 237.8 LBS | OXYGEN SATURATION: 94 % | HEIGHT: 70 IN | HEART RATE: 87 BPM | SYSTOLIC BLOOD PRESSURE: 139 MMHG | DIASTOLIC BLOOD PRESSURE: 57 MMHG

## 2021-07-01 VITALS
HEIGHT: 70 IN | DIASTOLIC BLOOD PRESSURE: 48 MMHG | HEART RATE: 64 BPM | SYSTOLIC BLOOD PRESSURE: 124 MMHG | BODY MASS INDEX: 32.3 KG/M2 | WEIGHT: 225.6 LBS | TEMPERATURE: 97.7 F

## 2021-07-02 VITALS
BODY MASS INDEX: 31.07 KG/M2 | SYSTOLIC BLOOD PRESSURE: 107 MMHG | HEIGHT: 70 IN | DIASTOLIC BLOOD PRESSURE: 30 MMHG | WEIGHT: 217 LBS | HEART RATE: 74 BPM | TEMPERATURE: 96.1 F

## 2021-07-02 VITALS
DIASTOLIC BLOOD PRESSURE: 92 MMHG | BODY MASS INDEX: 32.73 KG/M2 | WEIGHT: 228.6 LBS | HEART RATE: 92 BPM | SYSTOLIC BLOOD PRESSURE: 153 MMHG | TEMPERATURE: 98.1 F | HEIGHT: 70 IN

## 2021-07-02 VITALS
SYSTOLIC BLOOD PRESSURE: 95 MMHG | BODY MASS INDEX: 32.16 KG/M2 | DIASTOLIC BLOOD PRESSURE: 44 MMHG | TEMPERATURE: 97.9 F | HEIGHT: 70 IN | WEIGHT: 224.6 LBS | HEART RATE: 73 BPM | OXYGEN SATURATION: 92 %

## 2021-07-02 VITALS
TEMPERATURE: 97.8 F | SYSTOLIC BLOOD PRESSURE: 127 MMHG | HEIGHT: 70 IN | WEIGHT: 213.6 LBS | HEART RATE: 85 BPM | DIASTOLIC BLOOD PRESSURE: 54 MMHG | BODY MASS INDEX: 30.58 KG/M2

## 2021-07-02 VITALS
SYSTOLIC BLOOD PRESSURE: 133 MMHG | HEART RATE: 106 BPM | DIASTOLIC BLOOD PRESSURE: 78 MMHG | WEIGHT: 237.6 LBS | BODY MASS INDEX: 34.01 KG/M2 | TEMPERATURE: 97.8 F | HEIGHT: 70 IN